# Patient Record
Sex: FEMALE | Race: WHITE | NOT HISPANIC OR LATINO
[De-identification: names, ages, dates, MRNs, and addresses within clinical notes are randomized per-mention and may not be internally consistent; named-entity substitution may affect disease eponyms.]

---

## 2019-01-25 PROBLEM — Z00.00 ENCOUNTER FOR PREVENTIVE HEALTH EXAMINATION: Status: ACTIVE | Noted: 2019-01-25

## 2020-11-05 ENCOUNTER — APPOINTMENT (OUTPATIENT)
Dept: SURGERY | Facility: CLINIC | Age: 55
End: 2020-11-05
Payer: COMMERCIAL

## 2020-11-05 PROCEDURE — 99213 OFFICE O/P EST LOW 20 MIN: CPT

## 2020-11-05 PROCEDURE — 99072 ADDL SUPL MATRL&STAF TM PHE: CPT

## 2021-05-13 ENCOUNTER — APPOINTMENT (OUTPATIENT)
Dept: SURGERY | Facility: CLINIC | Age: 56
End: 2021-05-13
Payer: COMMERCIAL

## 2021-05-13 PROCEDURE — 99072 ADDL SUPL MATRL&STAF TM PHE: CPT

## 2021-05-13 PROCEDURE — 99213 OFFICE O/P EST LOW 20 MIN: CPT

## 2021-08-04 DIAGNOSIS — Z82.49 FAMILY HISTORY OF ISCHEMIC HEART DISEASE AND OTHER DISEASES OF THE CIRCULATORY SYSTEM: ICD-10-CM

## 2021-08-04 DIAGNOSIS — Z86.59 PERSONAL HISTORY OF OTHER MENTAL AND BEHAVIORAL DISORDERS: ICD-10-CM

## 2021-08-04 DIAGNOSIS — Z83.79 FAMILY HISTORY OF OTHER DISEASES OF THE DIGESTIVE SYSTEM: ICD-10-CM

## 2021-08-04 DIAGNOSIS — Z87.891 PERSONAL HISTORY OF NICOTINE DEPENDENCE: ICD-10-CM

## 2021-08-04 DIAGNOSIS — Z81.8 FAMILY HISTORY OF OTHER MENTAL AND BEHAVIORAL DISORDERS: ICD-10-CM

## 2021-08-04 DIAGNOSIS — E03.9 HYPOTHYROIDISM, UNSPECIFIED: ICD-10-CM

## 2021-08-04 DIAGNOSIS — Z83.3 FAMILY HISTORY OF DIABETES MELLITUS: ICD-10-CM

## 2021-08-04 DIAGNOSIS — N60.19 DIFFUSE CYSTIC MASTOPATHY OF UNSPECIFIED BREAST: ICD-10-CM

## 2021-08-04 DIAGNOSIS — Z86.2 PERSONAL HISTORY OF DISEASES OF THE BLOOD AND BLOOD-FORMING ORGANS AND CERTAIN DISORDERS INVOLVING THE IMMUNE MECHANISM: ICD-10-CM

## 2021-08-04 DIAGNOSIS — Z80.0 FAMILY HISTORY OF MALIGNANT NEOPLASM OF DIGESTIVE ORGANS: ICD-10-CM

## 2021-08-04 DIAGNOSIS — Z80.3 FAMILY HISTORY OF MALIGNANT NEOPLASM OF BREAST: ICD-10-CM

## 2021-08-04 DIAGNOSIS — Z82.5 FAMILY HISTORY OF ASTHMA AND OTHER CHRONIC LOWER RESPIRATORY DISEASES: ICD-10-CM

## 2021-08-04 DIAGNOSIS — Z81.1 FAMILY HISTORY OF ALCOHOL ABUSE AND DEPENDENCE: ICD-10-CM

## 2021-08-04 DIAGNOSIS — Z83.42 FAMILY HISTORY OF FAMILIAL HYPERCHOLESTEROLEMIA: ICD-10-CM

## 2021-08-04 DIAGNOSIS — Z87.11 PERSONAL HISTORY OF PEPTIC ULCER DISEASE: ICD-10-CM

## 2021-08-04 RX ORDER — EZETIMIBE 10 MG/1
10 TABLET ORAL DAILY
Refills: 0 | Status: ACTIVE | COMMUNITY

## 2021-08-04 RX ORDER — DICLOFENAC SODIUM 20 MG/G
2 SOLUTION TOPICAL
Refills: 0 | Status: ACTIVE | COMMUNITY

## 2021-08-04 RX ORDER — QUETIAPINE FUMARATE 400 MG/1
400 TABLET ORAL
Refills: 0 | Status: ACTIVE | COMMUNITY

## 2021-08-04 RX ORDER — LEVOTHYROXINE SODIUM 100 MCG
100 TABLET ORAL DAILY
Refills: 0 | Status: ACTIVE | COMMUNITY

## 2021-08-04 RX ORDER — SERTRALINE HYDROCHLORIDE 50 MG/1
50 TABLET, FILM COATED ORAL DAILY
Refills: 0 | Status: ACTIVE | COMMUNITY

## 2021-08-04 RX ORDER — OMEPRAZOLE 20 MG/1
20 TABLET, DELAYED RELEASE ORAL
Refills: 0 | Status: ACTIVE | COMMUNITY

## 2021-08-04 RX ORDER — LIRAGLUTIDE 6 MG/ML
INJECTION, SOLUTION SUBCUTANEOUS
Refills: 0 | Status: ACTIVE | COMMUNITY

## 2021-08-04 RX ORDER — RAMIPRIL 10 MG/1
10 CAPSULE ORAL DAILY
Refills: 0 | Status: ACTIVE | COMMUNITY

## 2021-08-04 RX ORDER — DICLOFENAC SODIUM 75 MG/1
75 TABLET, DELAYED RELEASE ORAL
Refills: 0 | Status: ACTIVE | COMMUNITY

## 2021-08-04 RX ORDER — ERGOCALCIFEROL 1.25 MG/1
1.25 MG CAPSULE, LIQUID FILLED ORAL
Refills: 0 | Status: ACTIVE | COMMUNITY

## 2022-01-06 ENCOUNTER — APPOINTMENT (OUTPATIENT)
Dept: SURGERY | Facility: CLINIC | Age: 57
End: 2022-01-06

## 2022-09-26 ENCOUNTER — NON-APPOINTMENT (OUTPATIENT)
Age: 57
End: 2022-09-26

## 2022-10-15 ENCOUNTER — NON-APPOINTMENT (OUTPATIENT)
Age: 57
End: 2022-10-15

## 2022-10-15 ENCOUNTER — APPOINTMENT (OUTPATIENT)
Dept: ORTHOPEDIC SURGERY | Facility: CLINIC | Age: 57
End: 2022-10-15

## 2022-10-15 VITALS
WEIGHT: 198.9 LBS | SYSTOLIC BLOOD PRESSURE: 106 MMHG | HEART RATE: 98 BPM | DIASTOLIC BLOOD PRESSURE: 74 MMHG | HEIGHT: 67 IN | BODY MASS INDEX: 31.22 KG/M2

## 2022-10-15 DIAGNOSIS — M17.12 UNILATERAL PRIMARY OSTEOARTHRITIS, LEFT KNEE: ICD-10-CM

## 2022-10-15 PROCEDURE — 73564 X-RAY EXAM KNEE 4 OR MORE: CPT | Mod: 50

## 2022-10-15 PROCEDURE — 99204 OFFICE O/P NEW MOD 45 MIN: CPT

## 2022-10-15 PROCEDURE — 72170 X-RAY EXAM OF PELVIS: CPT | Mod: 59

## 2022-10-26 ENCOUNTER — TRANSCRIPTION ENCOUNTER (OUTPATIENT)
Age: 57
End: 2022-10-26

## 2022-12-20 ENCOUNTER — OUTPATIENT (OUTPATIENT)
Dept: OUTPATIENT SERVICES | Facility: HOSPITAL | Age: 57
LOS: 1 days | End: 2022-12-20
Payer: COMMERCIAL

## 2022-12-20 VITALS
DIASTOLIC BLOOD PRESSURE: 70 MMHG | HEIGHT: 66.5 IN | OXYGEN SATURATION: 99 % | SYSTOLIC BLOOD PRESSURE: 109 MMHG | WEIGHT: 201.94 LBS | RESPIRATION RATE: 14 BRPM | HEART RATE: 80 BPM | TEMPERATURE: 98 F

## 2022-12-20 DIAGNOSIS — Z90.49 ACQUIRED ABSENCE OF OTHER SPECIFIED PARTS OF DIGESTIVE TRACT: Chronic | ICD-10-CM

## 2022-12-20 DIAGNOSIS — M17.12 UNILATERAL PRIMARY OSTEOARTHRITIS, LEFT KNEE: ICD-10-CM

## 2022-12-20 DIAGNOSIS — Z98.890 OTHER SPECIFIED POSTPROCEDURAL STATES: Chronic | ICD-10-CM

## 2022-12-20 DIAGNOSIS — Z98.1 ARTHRODESIS STATUS: Chronic | ICD-10-CM

## 2022-12-20 DIAGNOSIS — Z01.818 ENCOUNTER FOR OTHER PREPROCEDURAL EXAMINATION: ICD-10-CM

## 2022-12-20 LAB
A1C WITH ESTIMATED AVERAGE GLUCOSE RESULT: 5 % — SIGNIFICANT CHANGE UP (ref 4–5.6)
ALBUMIN SERPL ELPH-MCNC: 3.9 G/DL — SIGNIFICANT CHANGE UP (ref 3.3–5)
ALP SERPL-CCNC: 83 U/L — SIGNIFICANT CHANGE UP (ref 30–120)
ALT FLD-CCNC: 18 U/L DA — SIGNIFICANT CHANGE UP (ref 10–60)
ANION GAP SERPL CALC-SCNC: 7 MMOL/L — SIGNIFICANT CHANGE UP (ref 5–17)
APTT BLD: 31 SEC — SIGNIFICANT CHANGE UP (ref 27.5–35.5)
AST SERPL-CCNC: 21 U/L — SIGNIFICANT CHANGE UP (ref 10–40)
BILIRUB SERPL-MCNC: 0.4 MG/DL — SIGNIFICANT CHANGE UP (ref 0.2–1.2)
BLD GP AB SCN SERPL QL: SIGNIFICANT CHANGE UP
BUN SERPL-MCNC: 18 MG/DL — SIGNIFICANT CHANGE UP (ref 7–23)
CALCIUM SERPL-MCNC: 8.9 MG/DL — SIGNIFICANT CHANGE UP (ref 8.4–10.5)
CHLORIDE SERPL-SCNC: 107 MMOL/L — SIGNIFICANT CHANGE UP (ref 96–108)
CO2 SERPL-SCNC: 28 MMOL/L — SIGNIFICANT CHANGE UP (ref 22–31)
CREAT SERPL-MCNC: 0.75 MG/DL — SIGNIFICANT CHANGE UP (ref 0.5–1.3)
EGFR: 93 ML/MIN/1.73M2 — SIGNIFICANT CHANGE UP
ESTIMATED AVERAGE GLUCOSE: 97 MG/DL — SIGNIFICANT CHANGE UP (ref 68–114)
GLUCOSE SERPL-MCNC: 84 MG/DL — SIGNIFICANT CHANGE UP (ref 70–99)
HCT VFR BLD CALC: 39.4 % — SIGNIFICANT CHANGE UP (ref 34.5–45)
HGB BLD-MCNC: 13 G/DL — SIGNIFICANT CHANGE UP (ref 11.5–15.5)
INR BLD: 1.07 RATIO — SIGNIFICANT CHANGE UP (ref 0.88–1.16)
MCHC RBC-ENTMCNC: 30 PG — SIGNIFICANT CHANGE UP (ref 27–34)
MCHC RBC-ENTMCNC: 33 GM/DL — SIGNIFICANT CHANGE UP (ref 32–36)
MCV RBC AUTO: 90.8 FL — SIGNIFICANT CHANGE UP (ref 80–100)
NRBC # BLD: 0 /100 WBCS — SIGNIFICANT CHANGE UP (ref 0–0)
PLATELET # BLD AUTO: 230 K/UL — SIGNIFICANT CHANGE UP (ref 150–400)
POTASSIUM SERPL-MCNC: 4.3 MMOL/L — SIGNIFICANT CHANGE UP (ref 3.5–5.3)
POTASSIUM SERPL-SCNC: 4.3 MMOL/L — SIGNIFICANT CHANGE UP (ref 3.5–5.3)
PROT SERPL-MCNC: 7.3 G/DL — SIGNIFICANT CHANGE UP (ref 6–8.3)
PROTHROM AB SERPL-ACNC: 12.6 SEC — SIGNIFICANT CHANGE UP (ref 10.5–13.4)
RBC # BLD: 4.34 M/UL — SIGNIFICANT CHANGE UP (ref 3.8–5.2)
RBC # FLD: 12.2 % — SIGNIFICANT CHANGE UP (ref 10.3–14.5)
SODIUM SERPL-SCNC: 142 MMOL/L — SIGNIFICANT CHANGE UP (ref 135–145)
WBC # BLD: 4.81 K/UL — SIGNIFICANT CHANGE UP (ref 3.8–10.5)
WBC # FLD AUTO: 4.81 K/UL — SIGNIFICANT CHANGE UP (ref 3.8–10.5)

## 2022-12-20 PROCEDURE — G0463: CPT

## 2022-12-20 PROCEDURE — 93005 ELECTROCARDIOGRAM TRACING: CPT

## 2022-12-20 PROCEDURE — 93010 ELECTROCARDIOGRAM REPORT: CPT

## 2022-12-20 NOTE — H&P PST ADULT - NSICDXPASTMEDICALHX_GEN_ALL_CORE_FT
PAST MEDICAL HISTORY:  2019 novel coronavirus disease (COVID-19)     Anxiety and depression     Bipolar depression     HLD (hyperlipidemia)     Hypothyroid     Osteoarthritis of left knee

## 2022-12-20 NOTE — H&P PST ADULT - NSANTHOSAYNRD_GEN_A_CORE
No. SABINE screening performed.  STOP BANG Legend: 0-2 = LOW Risk; 3-4 = INTERMEDIATE Risk; 5-8 = HIGH Risk

## 2022-12-20 NOTE — H&P PST ADULT - NSICDXPASTSURGICALHX_GEN_ALL_CORE_FT
PAST SURGICAL HISTORY:  S/P appendectomy     S/P bilateral breast reduction     S/P breast biopsy     S/P cervical spinal fusion     S/P cholecystectomy     S/P lumpectomy, left breast

## 2022-12-20 NOTE — H&P PST ADULT - ATTENDING COMMENTS
The discussion regarding options for nonoperative and operative management was introduced through a patient shared decision making protocol. The benefits of surgery versus the risks were discussed with the patient  and family.  Risks of surgery include medical complications of anesthesia, DVT, pulmonary embolism, hardware complications, need for revision surgery, infection, neurovascular injury, as well as dislocation, fracture, and limb length discrepancy were addressed with the patient. The patient appeared to understand the ramifications of surgery and had ample time for questions, then consenting for a left total knee replacement.

## 2022-12-20 NOTE — H&P PST ADULT - PROBLEM SELECTOR PLAN 1
scheduled for left knee replacement on 1/9/22  will obtain medical clearance   Pre op instructions   COVID test on 1/6/22 !2N scheduled for left knee replacement on 1/9/22  will obtain medical clearance   Pre op instructions   COVID test on 1/6/23  1 pm

## 2022-12-20 NOTE — H&P PST ADULT - NSICDXFAMILYHX_GEN_ALL_CORE_FT
FAMILY HISTORY:  FHx: heart disease    Mother  Still living? No  Family history of breast cancer in mother, Age at diagnosis: Age Unknown  FH: pancreatic cancer, Age at diagnosis: Age Unknown  FH: type 2 diabetes, Age at diagnosis: Age Unknown

## 2022-12-20 NOTE — H&P PST ADULT - MUSCULOSKELETAL
left knee/no calf tenderness/decreased ROM/decreased ROM due to pain/no chest wall tenderness details… left knee/no calf tenderness/decreased ROM/decreased ROM due to pain/back exam

## 2022-12-20 NOTE — H&P PST ADULT - HISTORY OF PRESENT ILLNESS
This is a 58 y/o female who presents with 10 year history og left knee pain and swelling . Reports constant achy pain and pain excaerbates with when walking , sitting ,climbing stiars 10/10 . Diclofenac PRN for pain with mild relief . Received cortisone injections in the past 2 yrs ago with mild relief . scheduled for left kne replacement on 1/9/22 This is a 58 y/o female who presents with 10 year history of left knee pain and swelling . Reports constant achy pain and pain exacerbates with walking, sitting ,climbing stairs  10/10 . Diclofenac PRN for pain with mild relief . Received cortisone injections in the past 2 yrs ago with mild relief . scheduled for left knee replacement on 1/9/23

## 2022-12-21 LAB
MRSA PCR RESULT.: SIGNIFICANT CHANGE UP
S AUREUS DNA NOSE QL NAA+PROBE: SIGNIFICANT CHANGE UP

## 2023-01-05 PROBLEM — E78.5 HYPERLIPIDEMIA, UNSPECIFIED: Chronic | Status: ACTIVE | Noted: 2022-12-20

## 2023-01-05 PROBLEM — U07.1 COVID-19: Chronic | Status: ACTIVE | Noted: 2022-12-20

## 2023-01-05 PROBLEM — F31.9 BIPOLAR DISORDER, UNSPECIFIED: Chronic | Status: ACTIVE | Noted: 2022-12-20

## 2023-01-05 PROBLEM — F41.9 ANXIETY DISORDER, UNSPECIFIED: Chronic | Status: ACTIVE | Noted: 2022-12-20

## 2023-01-06 ENCOUNTER — OUTPATIENT (OUTPATIENT)
Dept: OUTPATIENT SERVICES | Facility: HOSPITAL | Age: 58
LOS: 1 days | End: 2023-01-06
Payer: COMMERCIAL

## 2023-01-06 DIAGNOSIS — Z90.49 ACQUIRED ABSENCE OF OTHER SPECIFIED PARTS OF DIGESTIVE TRACT: Chronic | ICD-10-CM

## 2023-01-06 DIAGNOSIS — Z98.890 OTHER SPECIFIED POSTPROCEDURAL STATES: Chronic | ICD-10-CM

## 2023-01-06 DIAGNOSIS — Z20.828 CONTACT WITH AND (SUSPECTED) EXPOSURE TO OTHER VIRAL COMMUNICABLE DISEASES: ICD-10-CM

## 2023-01-06 DIAGNOSIS — Z98.1 ARTHRODESIS STATUS: Chronic | ICD-10-CM

## 2023-01-06 PROCEDURE — U0003: CPT

## 2023-01-06 PROCEDURE — U0005: CPT

## 2023-01-06 RX ORDER — CEFAZOLIN SODIUM 1 G
2000 VIAL (EA) INJECTION ONCE
Refills: 0 | Status: DISCONTINUED | OUTPATIENT
Start: 2023-01-09 | End: 2023-01-09

## 2023-01-06 RX ORDER — ACETAMINOPHEN 500 MG
1000 TABLET ORAL ONCE
Refills: 0 | Status: DISCONTINUED | OUTPATIENT
Start: 2023-01-09 | End: 2023-01-09

## 2023-01-06 RX ORDER — TRANEXAMIC ACID 100 MG/ML
1000 INJECTION, SOLUTION INTRAVENOUS ONCE
Refills: 0 | Status: DISCONTINUED | OUTPATIENT
Start: 2023-01-09 | End: 2023-01-23

## 2023-01-06 RX ORDER — TRANEXAMIC ACID 100 MG/ML
1 INJECTION, SOLUTION INTRAVENOUS ONCE
Refills: 0 | Status: DISCONTINUED | OUTPATIENT
Start: 2023-01-09 | End: 2023-01-23

## 2023-01-07 LAB — SARS-COV-2 RNA SPEC QL NAA+PROBE: SIGNIFICANT CHANGE UP

## 2023-01-08 ENCOUNTER — TRANSCRIPTION ENCOUNTER (OUTPATIENT)
Age: 58
End: 2023-01-08

## 2023-01-09 ENCOUNTER — OUTPATIENT (OUTPATIENT)
Dept: OUTPATIENT SERVICES | Facility: HOSPITAL | Age: 58
LOS: 1 days | End: 2023-01-09
Payer: COMMERCIAL

## 2023-01-09 ENCOUNTER — TRANSCRIPTION ENCOUNTER (OUTPATIENT)
Age: 58
End: 2023-01-09

## 2023-01-09 ENCOUNTER — APPOINTMENT (OUTPATIENT)
Dept: ORTHOPEDIC SURGERY | Facility: HOSPITAL | Age: 58
End: 2023-01-09

## 2023-01-09 VITALS
HEART RATE: 95 BPM | OXYGEN SATURATION: 97 % | SYSTOLIC BLOOD PRESSURE: 121 MMHG | RESPIRATION RATE: 20 BRPM | WEIGHT: 197.75 LBS | HEIGHT: 66 IN | DIASTOLIC BLOOD PRESSURE: 61 MMHG | TEMPERATURE: 98 F

## 2023-01-09 DIAGNOSIS — E03.9 HYPOTHYROIDISM, UNSPECIFIED: ICD-10-CM

## 2023-01-09 DIAGNOSIS — Z90.49 ACQUIRED ABSENCE OF OTHER SPECIFIED PARTS OF DIGESTIVE TRACT: Chronic | ICD-10-CM

## 2023-01-09 DIAGNOSIS — Z98.890 OTHER SPECIFIED POSTPROCEDURAL STATES: Chronic | ICD-10-CM

## 2023-01-09 DIAGNOSIS — E78.5 HYPERLIPIDEMIA, UNSPECIFIED: ICD-10-CM

## 2023-01-09 DIAGNOSIS — Z98.1 ARTHRODESIS STATUS: Chronic | ICD-10-CM

## 2023-01-09 DIAGNOSIS — F31.9 BIPOLAR DISORDER, UNSPECIFIED: ICD-10-CM

## 2023-01-09 DIAGNOSIS — M17.12 UNILATERAL PRIMARY OSTEOARTHRITIS, LEFT KNEE: ICD-10-CM

## 2023-01-09 LAB — ABO RH CONFIRMATION: SIGNIFICANT CHANGE UP

## 2023-01-09 PROCEDURE — 99203 OFFICE O/P NEW LOW 30 MIN: CPT | Mod: 25

## 2023-01-09 PROCEDURE — 73562 X-RAY EXAM OF KNEE 3: CPT | Mod: 26,LT

## 2023-01-09 PROCEDURE — 27447 TOTAL KNEE ARTHROPLASTY: CPT | Mod: LT

## 2023-01-09 PROCEDURE — 27447 TOTAL KNEE ARTHROPLASTY: CPT | Mod: AS,LT

## 2023-01-09 DEVICE — IMPLANTABLE DEVICE: Type: IMPLANTABLE DEVICE | Site: LEFT | Status: FUNCTIONAL

## 2023-01-09 DEVICE — COMP FEM NON PORUS SZ 5 LT: Type: IMPLANTABLE DEVICE | Site: LEFT | Status: FUNCTIONAL

## 2023-01-09 DEVICE — STEM MOD UNIV TIB 10X40MM: Type: IMPLANTABLE DEVICE | Site: LEFT | Status: FUNCTIONAL

## 2023-01-09 DEVICE — BASEPLATE TIB NONPOROUS UNIV SZ  5 LT: Type: IMPLANTABLE DEVICE | Site: LEFT | Status: FUNCTIONAL

## 2023-01-09 DEVICE — COMP PATELLA TRI-PEG E-PLUS POLY 8X32MM: Type: IMPLANTABLE DEVICE | Site: LEFT | Status: FUNCTIONAL

## 2023-01-09 RX ORDER — OXYCODONE HYDROCHLORIDE 5 MG/1
10 TABLET ORAL
Refills: 0 | Status: DISCONTINUED | OUTPATIENT
Start: 2023-01-09 | End: 2023-01-09

## 2023-01-09 RX ORDER — TOPIRAMATE 25 MG
50 TABLET ORAL DAILY
Refills: 0 | Status: DISCONTINUED | OUTPATIENT
Start: 2023-01-09 | End: 2023-01-23

## 2023-01-09 RX ORDER — ASPIRIN/CALCIUM CARB/MAGNESIUM 324 MG
1 TABLET ORAL
Qty: 56 | Refills: 0
Start: 2023-01-09 | End: 2023-02-05

## 2023-01-09 RX ORDER — PANTOPRAZOLE SODIUM 20 MG/1
40 TABLET, DELAYED RELEASE ORAL
Refills: 0 | Status: DISCONTINUED | OUTPATIENT
Start: 2023-01-09 | End: 2023-01-23

## 2023-01-09 RX ORDER — MAGNESIUM HYDROXIDE 400 MG/1
30 TABLET, CHEWABLE ORAL DAILY
Refills: 0 | Status: DISCONTINUED | OUTPATIENT
Start: 2023-01-09 | End: 2023-01-23

## 2023-01-09 RX ORDER — SERTRALINE 25 MG/1
100 TABLET, FILM COATED ORAL DAILY
Refills: 0 | Status: DISCONTINUED | OUTPATIENT
Start: 2023-01-09 | End: 2023-01-23

## 2023-01-09 RX ORDER — QUETIAPINE FUMARATE 200 MG/1
400 TABLET, FILM COATED ORAL AT BEDTIME
Refills: 0 | Status: DISCONTINUED | OUTPATIENT
Start: 2023-01-09 | End: 2023-01-23

## 2023-01-09 RX ORDER — HYDROMORPHONE HYDROCHLORIDE 2 MG/ML
0.5 INJECTION INTRAMUSCULAR; INTRAVENOUS; SUBCUTANEOUS
Refills: 0 | Status: DISCONTINUED | OUTPATIENT
Start: 2023-01-09 | End: 2023-01-09

## 2023-01-09 RX ORDER — CHLORHEXIDINE GLUCONATE 213 G/1000ML
1 SOLUTION TOPICAL ONCE
Refills: 0 | Status: COMPLETED | OUTPATIENT
Start: 2023-01-09 | End: 2023-01-09

## 2023-01-09 RX ORDER — DICLOFENAC SODIUM 75 MG/1
0 TABLET, DELAYED RELEASE ORAL
Qty: 0 | Refills: 0 | DISCHARGE

## 2023-01-09 RX ORDER — SODIUM CHLORIDE 9 MG/ML
500 INJECTION INTRAMUSCULAR; INTRAVENOUS; SUBCUTANEOUS ONCE
Refills: 0 | Status: COMPLETED | OUTPATIENT
Start: 2023-01-09 | End: 2023-01-09

## 2023-01-09 RX ORDER — ACETAMINOPHEN 500 MG
1000 TABLET ORAL EVERY 6 HOURS
Refills: 0 | Status: COMPLETED | OUTPATIENT
Start: 2023-01-09 | End: 2023-01-09

## 2023-01-09 RX ORDER — ASPIRIN/CALCIUM CARB/MAGNESIUM 324 MG
81 TABLET ORAL EVERY 12 HOURS
Refills: 0 | Status: DISCONTINUED | OUTPATIENT
Start: 2023-01-10 | End: 2023-01-23

## 2023-01-09 RX ORDER — OMEPRAZOLE 10 MG/1
1 CAPSULE, DELAYED RELEASE ORAL
Qty: 28 | Refills: 0
Start: 2023-01-09 | End: 2023-02-05

## 2023-01-09 RX ORDER — ONDANSETRON 8 MG/1
4 TABLET, FILM COATED ORAL EVERY 6 HOURS
Refills: 0 | Status: DISCONTINUED | OUTPATIENT
Start: 2023-01-09 | End: 2023-01-23

## 2023-01-09 RX ORDER — POLYETHYLENE GLYCOL 3350 17 G/17G
17 POWDER, FOR SOLUTION ORAL AT BEDTIME
Refills: 0 | Status: DISCONTINUED | OUTPATIENT
Start: 2023-01-09 | End: 2023-01-23

## 2023-01-09 RX ORDER — CELECOXIB 200 MG/1
1 CAPSULE ORAL
Qty: 56 | Refills: 0
Start: 2023-01-09 | End: 2023-02-05

## 2023-01-09 RX ORDER — SODIUM CHLORIDE 9 MG/ML
1000 INJECTION, SOLUTION INTRAVENOUS
Refills: 0 | Status: DISCONTINUED | OUTPATIENT
Start: 2023-01-09 | End: 2023-01-09

## 2023-01-09 RX ORDER — HYDROMORPHONE HYDROCHLORIDE 2 MG/ML
1 INJECTION INTRAMUSCULAR; INTRAVENOUS; SUBCUTANEOUS
Refills: 0 | Status: DISCONTINUED | OUTPATIENT
Start: 2023-01-09 | End: 2023-01-09

## 2023-01-09 RX ORDER — CEFAZOLIN SODIUM 1 G
2000 VIAL (EA) INJECTION EVERY 8 HOURS
Refills: 0 | Status: COMPLETED | OUTPATIENT
Start: 2023-01-09 | End: 2023-01-09

## 2023-01-09 RX ORDER — ONDANSETRON 8 MG/1
4 TABLET, FILM COATED ORAL ONCE
Refills: 0 | Status: DISCONTINUED | OUTPATIENT
Start: 2023-01-09 | End: 2023-01-09

## 2023-01-09 RX ORDER — SENNA PLUS 8.6 MG/1
2 TABLET ORAL AT BEDTIME
Refills: 0 | Status: DISCONTINUED | OUTPATIENT
Start: 2023-01-09 | End: 2023-01-23

## 2023-01-09 RX ORDER — SODIUM CHLORIDE 9 MG/ML
1000 INJECTION, SOLUTION INTRAVENOUS
Refills: 0 | Status: DISCONTINUED | OUTPATIENT
Start: 2023-01-09 | End: 2023-01-23

## 2023-01-09 RX ORDER — QUETIAPINE FUMARATE 200 MG/1
200 TABLET, FILM COATED ORAL AT BEDTIME
Refills: 0 | Status: DISCONTINUED | OUTPATIENT
Start: 2023-01-09 | End: 2023-01-23

## 2023-01-09 RX ORDER — DEXAMETHASONE 0.5 MG/5ML
8 ELIXIR ORAL ONCE
Refills: 0 | Status: COMPLETED | OUTPATIENT
Start: 2023-01-10 | End: 2023-01-10

## 2023-01-09 RX ORDER — CELECOXIB 200 MG/1
200 CAPSULE ORAL EVERY 12 HOURS
Refills: 0 | Status: DISCONTINUED | OUTPATIENT
Start: 2023-01-09 | End: 2023-01-23

## 2023-01-09 RX ORDER — OXYCODONE HYDROCHLORIDE 5 MG/1
5 TABLET ORAL
Refills: 0 | Status: DISCONTINUED | OUTPATIENT
Start: 2023-01-09 | End: 2023-01-10

## 2023-01-09 RX ORDER — LEVOTHYROXINE SODIUM 125 MCG
112 TABLET ORAL DAILY
Refills: 0 | Status: DISCONTINUED | OUTPATIENT
Start: 2023-01-09 | End: 2023-01-23

## 2023-01-09 RX ORDER — APREPITANT 80 MG/1
40 CAPSULE ORAL ONCE
Refills: 0 | Status: COMPLETED | OUTPATIENT
Start: 2023-01-09 | End: 2023-01-09

## 2023-01-09 RX ADMIN — Medication 100 MILLIGRAM(S): at 16:42

## 2023-01-09 RX ADMIN — Medication 1000 MILLIGRAM(S): at 19:30

## 2023-01-09 RX ADMIN — POLYETHYLENE GLYCOL 3350 17 GRAM(S): 17 POWDER, FOR SOLUTION ORAL at 21:34

## 2023-01-09 RX ADMIN — SODIUM CHLORIDE 75 MILLILITER(S): 9 INJECTION, SOLUTION INTRAVENOUS at 13:56

## 2023-01-09 RX ADMIN — CELECOXIB 200 MILLIGRAM(S): 200 CAPSULE ORAL at 21:29

## 2023-01-09 RX ADMIN — SODIUM CHLORIDE 100 MILLILITER(S): 9 INJECTION, SOLUTION INTRAVENOUS at 23:52

## 2023-01-09 RX ADMIN — SODIUM CHLORIDE 1000 MILLILITER(S): 9 INJECTION INTRAMUSCULAR; INTRAVENOUS; SUBCUTANEOUS at 17:16

## 2023-01-09 RX ADMIN — APREPITANT 40 MILLIGRAM(S): 80 CAPSULE ORAL at 09:42

## 2023-01-09 RX ADMIN — OXYCODONE HYDROCHLORIDE 5 MILLIGRAM(S): 5 TABLET ORAL at 20:15

## 2023-01-09 RX ADMIN — SENNA PLUS 2 TABLET(S): 8.6 TABLET ORAL at 21:29

## 2023-01-09 RX ADMIN — SODIUM CHLORIDE 1000 MILLILITER(S): 9 INJECTION INTRAMUSCULAR; INTRAVENOUS; SUBCUTANEOUS at 14:11

## 2023-01-09 RX ADMIN — Medication 100 MILLIGRAM(S): at 23:51

## 2023-01-09 RX ADMIN — QUETIAPINE FUMARATE 400 MILLIGRAM(S): 200 TABLET, FILM COATED ORAL at 21:29

## 2023-01-09 RX ADMIN — Medication 400 MILLIGRAM(S): at 23:48

## 2023-01-09 RX ADMIN — SERTRALINE 100 MILLIGRAM(S): 25 TABLET, FILM COATED ORAL at 21:29

## 2023-01-09 RX ADMIN — OXYCODONE HYDROCHLORIDE 5 MILLIGRAM(S): 5 TABLET ORAL at 19:39

## 2023-01-09 RX ADMIN — QUETIAPINE FUMARATE 200 MILLIGRAM(S): 200 TABLET, FILM COATED ORAL at 21:28

## 2023-01-09 RX ADMIN — SODIUM CHLORIDE 100 MILLILITER(S): 9 INJECTION, SOLUTION INTRAVENOUS at 16:43

## 2023-01-09 RX ADMIN — CHLORHEXIDINE GLUCONATE 1 APPLICATION(S): 213 SOLUTION TOPICAL at 09:42

## 2023-01-09 RX ADMIN — CELECOXIB 200 MILLIGRAM(S): 200 CAPSULE ORAL at 21:34

## 2023-01-09 RX ADMIN — Medication 400 MILLIGRAM(S): at 19:10

## 2023-01-09 RX ADMIN — Medication 50 MILLIGRAM(S): at 21:29

## 2023-01-09 NOTE — PHYSICAL THERAPY INITIAL EVALUATION ADULT - ADDITIONAL COMMENTS
Pt lives with  in a private home, there are 3 stairs to enter and no stairs to navigate within the home. Pt has a tub shower. PTA pt was independent with ADLs and ambulation. Pt owns a RW and commode.

## 2023-01-09 NOTE — CONSULT NOTE ADULT - SUBJECTIVE AND OBJECTIVE BOX
Patient is a 57y old  Female who presents with a chief complaint of Left TKR for severe left knee OA.  (09 Jan 2023 13:30)      HPI: 57F presented with 10 year history of left knee pain and swelling.  She reported constant achy pain and pain exacerbated by walking, sitting, climbing stairs up to 10/10. Has been using Diclofenac PRN for pain with mild relief. Received cortisone injections in the past 2 years ago with mild relief.  Now s/p left knee replacement.    REVIEW OF SYSTEMS:  CONSTITUTIONAL: No fever, weight loss, or fatigue  EYES: No eye pain, visual disturbances, or discharge  ENMT:  No difficulty hearing, tinnitus, vertigo; No sinus or throat pain  NECK: No pain or stiffness  BREASTS: No pain, masses, or nipple discharge  RESPIRATORY: No cough, wheezing, chills or hemoptysis; No shortness of breath  CARDIOVASCULAR: No chest pain, palpitations, dizziness, or leg swelling  GASTROINTESTINAL: No abdominal or epigastric pain. No nausea, vomiting, or hematemesis; No diarrhea or constipation. No melena or hematochezia.  GENITOURINARY: No dysuria, frequency, hematuria, or incontinence  NEUROLOGICAL: No headaches, memory loss, loss of strength, numbness, or tremors  SKIN: No itching, burning, rashes, or lesions   LYMPH NODES: No enlarged glands  ENDOCRINE: No heat or cold intolerance; No hair loss  MUSCULOSKELETAL: No muscle or back pain  PSYCHIATRIC: No depression, anxiety, mood swings, or difficulty sleeping  HEME/LYMPH: No easy bruising, or bleeding gums  ALLERGY AND IMMUNOLOGIC: No hives or eczema    PAST MEDICAL & SURGICAL HISTORY:  2019 novel coronavirus disease (COVID-19)    Osteoarthritis of left knee    Hypothyroid    Bipolar depression    Anxiety and depression    HLD (hyperlipidemia)    S/P bilateral breast reduction    S/P appendectomy    S/P cholecystectomy    S/P lumpectomy, left breast    S/P cervical spinal fusion    S/P breast biopsy          SOCIAL HISTORY:  Residence: [ ] [facility]  [ ] SNF  [ ] Community  [ ] Substance abuse: denies  [ ] Tobacco: vapes  [ ] Alcohol use: denies    Allergies  codeine (Vomiting)  statins (Other)      MEDICATIONS  (STANDING):  acetaminophen   IVPB .. 1000 milliGRAM(s) IV Intermittent once  acetaminophen   IVPB .. 1000 milliGRAM(s) IV Intermittent every 6 hours  ceFAZolin   IVPB 2000 milliGRAM(s) IV Intermittent once  ceFAZolin   IVPB 2000 milliGRAM(s) IV Intermittent every 8 hours  celecoxib 200 milliGRAM(s) Oral every 12 hours  lactated ringers. 1000 milliLiter(s) (100 mL/Hr) IV Continuous <Continuous>  levothyroxine 112 MICROGram(s) Oral daily  pantoprazole    Tablet 40 milliGRAM(s) Oral before breakfast  polyethylene glycol 3350 17 Gram(s) Oral at bedtime  QUEtiapine 200 milliGRAM(s) Oral at bedtime  QUEtiapine 400 milliGRAM(s) Oral at bedtime  senna 2 Tablet(s) Oral at bedtime  sertraline 100 milliGRAM(s) Oral daily  sodium chloride 0.9% Bolus 500 milliLiter(s) IV Bolus once  topiramate 50 milliGRAM(s) Oral daily  tranexamic acid 4% Topical Irrigation 1 Application(s) IntraArticular once  tranexamic acid IVPB 1000 milliGRAM(s) IV Intermittent once    MEDICATIONS  (PRN):  HYDROmorphone  Injectable 0.5 milliGRAM(s) IV Push every 3 hours PRN breakthrough pain  magnesium hydroxide Suspension 30 milliLiter(s) Oral daily PRN Constipation  ondansetron Injectable 4 milliGRAM(s) IV Push every 6 hours PRN Nausea and/or Vomiting  oxyCODONE    IR 5 milliGRAM(s) Oral every 3 hours PRN Moderate Pain (4 - 6)  oxyCODONE    IR 10 milliGRAM(s) Oral every 3 hours PRN Severe Pain (7 - 10)      FAMILY HISTORY:  Family history of breast cancer in mother (Mother)    FHx: heart disease    FH: pancreatic cancer (Mother)    FH: type 2 diabetes (Mother)        Vital Signs Last 24 Hrs  T(C): 36.7 (09 Jan 2023 16:16), Max: 36.7 (09 Jan 2023 09:07)  T(F): 98.1 (09 Jan 2023 16:16), Max: 98.1 (09 Jan 2023 16:16)  HR: 79 (09 Jan 2023 16:16) (75 - 95)  BP: 100/55 (09 Jan 2023 16:16) (90/45 - 121/61)  BP(mean): --  RR: 18 (09 Jan 2023 16:16) (10 - 20)  SpO2: 97% (09 Jan 2023 16:16) (97% - 100%)    Parameters below as of 09 Jan 2023 16:16  Patient On (Oxygen Delivery Method): room air        PHYSICAL EXAM:    GENERAL: NAD, well-groomed, well-developed  HEAD:  Atraumatic, Normocephalic  EYES: cnjunctiva and sclera clear  ENMT: Moist mucous membranes  NECK: Supple, No JVD  NERVOUS SYSTEM:  Alert & Oriented X3, Good concentration; Moving all 4 extremities; No gross sensory deficits  CHEST/LUNG: Clear to auscultation bilaterally;   HEART: Regular rate and rhythm;   ABDOMEN: Soft, Nontender, Nondistended; Bowel sounds present  EXTREMITIES:  2+ Peripheral Pulses, No clubbing, cyanosis, or edema  LYMPH: No lymphadenopathy noted  /RECTAL: Not examined  BREAST: Not examined  SKIN: No rashes or lesions  INCISION:  ACE wrap dry and intact.     LABS:        CAPILLARY BLOOD GLUCOSE    RADIOLOGY & ADDITIONAL STUDIES:    EKG:  NSR  Personally Reviewed:  [x YES     Imaging:  TKR  Personally Reviewed:  [x] YES     Consultant(s) Notes Reviewed:   Pre-op med clearance reviewed.    Care Discussed with Consultants/Other Providers:

## 2023-01-09 NOTE — OCCUPATIONAL THERAPY INITIAL EVALUATION ADULT - LIVES WITH, PROFILE
Pt lives with her  in a private home, 3 steps to enter, 0 steps inside with a tub. Pt was independent with ADLs, IADLs, functional mobility/transfers prior to admission without AD./spouse

## 2023-01-09 NOTE — DISCHARGE NOTE PROVIDER - NSDCMRMEDTOKEN_GEN_ALL_CORE_FT
Aspirin Enteric Coated 81 mg oral delayed release tablet: 1 tab orally every 12 hours. Take aspirin 2 hours before Celebrex/Meloxicam.  CeleBREX 200 mg oral capsule: 1 cap orally every 12 hours. Take 2 hours after aspirin.  ezetimibe 10 mg oral tablet: tab(s) orally once a day  omeprazole 20 mg oral delayed release capsule: 1 cap(s) orally once a day  omeprazole 20 mg oral delayed release capsule: 1 cap(s) orally once a day   phentermine 15 mg oral capsule: 1 cap(s) orally once a day (in the morning)  QUEtiapine 200 mg oral tablet: orally once a day (at bedtime)  QUEtiapine 400 mg oral tablet, extended release: 1 tab(s) orally once a day (in the evening)  sertraline 100 mg oral tablet: 1 tab(s) orally once a day  Synthroid 112 mcg (0.112 mg) oral tablet: 1 tab(s) orally once a day  topiramate 50 mg oral tablet: orally once a day  Vitamin D2 50,000 intl units (1.25 mg) oral capsule (obsolete): orally 2 times a month  Wegovy (2.4 mg dose) subcutaneous solution: subcutaneous once a week   Aspirin Enteric Coated 81 mg oral delayed release tablet: 1 tab orally every 12 hours. Take aspirin 2 hours before Celebrex/Meloxicam.  CeleBREX 200 mg oral capsule: 1 cap orally every 12 hours. Take 2 hours after aspirin.  ezetimibe 10 mg oral tablet: tab(s) orally once a day  omeprazole 20 mg oral delayed release capsule: 1 cap(s) orally once a day   oxyCODONE 5 mg oral tablet: 1-2 tab(s) orally every 4 hours, As Needed -for moderate to severe pain MDD:6  Reference #: [id number]  phentermine 15 mg oral capsule: 1 cap(s) orally once a day (in the morning)  QUEtiapine 200 mg oral tablet: orally once a day (at bedtime)  QUEtiapine 400 mg oral tablet, extended release: 1 tab(s) orally once a day (in the evening)  sertraline 100 mg oral tablet: 1 tab(s) orally once a day  Synthroid 112 mcg (0.112 mg) oral tablet: 1 tab(s) orally once a day  topiramate 50 mg oral tablet: orally once a day  Vitamin D2 50,000 intl units (1.25 mg) oral capsule (obsolete): orally 2 times a month  Wegovy (2.4 mg dose) subcutaneous solution: subcutaneous once a week

## 2023-01-09 NOTE — ASU PATIENT PROFILE, ADULT - FALL HARM RISK - UNIVERSAL INTERVENTIONS
Bed in lowest position, wheels locked, appropriate side rails in place/Call bell, personal items and telephone in reach/Instruct patient to call for assistance before getting out of bed or chair/Non-slip footwear when patient is out of bed/[place] to call system/Physically safe environment - no spills, clutter or unnecessary equipment/Purposeful Proactive Rounding/Room/bathroom lighting operational, light cord in reach

## 2023-01-09 NOTE — DISCHARGE NOTE PROVIDER - HOSPITAL COURSE
This patient was admitted to [facility] with a history of severe degenerative joint disease of the left knee.  Patient underwent Pre-Surgical Testing and was medically cleared to undergo elective procedure. Patient underwent left TKR by Dr. Michael Stoll 1/9/23. Procedure was well tolerated.  No operative or chidi-operative complications arose during patient's hospital course.  Patient received antibiotic according to SCIP guidelines for infection prevention.  Aspirin 81mg q 12 h was given for DVT prophylaxis, in addition to the use of SCDs.  Anesthesia, Medical Hospitalist, Physical Therapy and Occupational Therapy were consulted. Patient is stable for discharge with a good prognosis.  Appropriate discharge instructions and medications are provided in this document.

## 2023-01-09 NOTE — CONSULT NOTE ADULT - PROBLEM SELECTOR RECOMMENDATION 9
Pain Management: acceptable- continue current care Tylenol ATC/Celebrex ATC/ Oxycodone PRN  Continue PT/OT  DVT proph: [ x ] low risk - Aspirin   DC plan:  [x ] Home with HC

## 2023-01-09 NOTE — DISCHARGE NOTE PROVIDER - PROVIDER TOKENS
PROVIDER:[TOKEN:[[id number]],SCHEDULEDAPPT:[01/27/2023],SCHEDULEDAPPTTIME:[10:30 AM],ESTABLISHEDPATIENT:[T]]

## 2023-01-09 NOTE — DISCHARGE NOTE PROVIDER - NSDCCPCAREPLAN_GEN_ALL_CORE_FT
PRINCIPAL DISCHARGE DIAGNOSIS  Diagnosis: Primary osteoarthritis of left knee  Assessment and Plan of Treatment: For your total knee replacement:  Physical Therapy/Occupational Therapy for: ambulation, transfers, stairs, ADL's (activities of daily living), range of motion exercises, and isometrics  -Activity  • Weight Bearing as tolerated with rolling walker.  • Cryo/cuff 20 minutes several times daily with at least a 1 hour break in between icing sessions  • Take short, frequent walks increasing the distance that you walk each day as tolerated.  • Change your position every hour to decrease pain and stiffness.  • Continue the exercises taught to you by your physical therapist.  • No driving until cleared by the doctor.  • No tub baths, hot tubs, or swimming pools until instructed by your doctor.  • Do not squat down on the floor.  • Do not kneel or twist your knee.  • Range of Motion Goals: Flexion= 120 degrees, Extension = 0 degrees  You have a Mepilex dressing. You may shower. Mepilex dressing is water resistant, not waterproof. Do not aim shower stream at surgical site.  Pat dry after showering.  Remove Mepilex dressing in 1 week. You have steristrips over your incision which will fall off on their own.  Keep incision clean. DO NOT APPLY ANYTHING to incision site (salves/ointments/creams).  May shower.  Do not scrub incision site. Pat dry after shower.     Thigh high compression stockings. Keep on operative leg until postoperative appointment. Can remove compression stocking to shower/ bathe.

## 2023-01-09 NOTE — DISCHARGE NOTE PROVIDER - CARE PROVIDER_API CALL
Michael Stoll)  Orthopedics  [address], [address]  [place], [place] [id number]  Phone: [phone]  Fax: [phone]  Established Patient  Scheduled Appointment: 01/27/2023 10:30 AM

## 2023-01-10 ENCOUNTER — TRANSCRIPTION ENCOUNTER (OUTPATIENT)
Age: 58
End: 2023-01-10

## 2023-01-10 VITALS
TEMPERATURE: 97 F | HEART RATE: 77 BPM | OXYGEN SATURATION: 92 % | SYSTOLIC BLOOD PRESSURE: 105 MMHG | DIASTOLIC BLOOD PRESSURE: 65 MMHG | RESPIRATION RATE: 18 BRPM

## 2023-01-10 LAB
ANION GAP SERPL CALC-SCNC: 7 MMOL/L — SIGNIFICANT CHANGE UP (ref 5–17)
BUN SERPL-MCNC: 12 MG/DL — SIGNIFICANT CHANGE UP (ref 7–23)
CALCIUM SERPL-MCNC: 9.1 MG/DL — SIGNIFICANT CHANGE UP (ref 8.4–10.5)
CHLORIDE SERPL-SCNC: 106 MMOL/L — SIGNIFICANT CHANGE UP (ref 96–108)
CO2 SERPL-SCNC: 24 MMOL/L — SIGNIFICANT CHANGE UP (ref 22–31)
CREAT SERPL-MCNC: 0.78 MG/DL — SIGNIFICANT CHANGE UP (ref 0.5–1.3)
EGFR: 89 ML/MIN/1.73M2 — SIGNIFICANT CHANGE UP
GLUCOSE SERPL-MCNC: 123 MG/DL — HIGH (ref 70–99)
HCT VFR BLD CALC: 36.1 % — SIGNIFICANT CHANGE UP (ref 34.5–45)
HGB BLD-MCNC: 12.1 G/DL — SIGNIFICANT CHANGE UP (ref 11.5–15.5)
MCHC RBC-ENTMCNC: 30.5 PG — SIGNIFICANT CHANGE UP (ref 27–34)
MCHC RBC-ENTMCNC: 33.5 GM/DL — SIGNIFICANT CHANGE UP (ref 32–36)
MCV RBC AUTO: 90.9 FL — SIGNIFICANT CHANGE UP (ref 80–100)
NRBC # BLD: 0 /100 WBCS — SIGNIFICANT CHANGE UP (ref 0–0)
PLATELET # BLD AUTO: 238 K/UL — SIGNIFICANT CHANGE UP (ref 150–400)
POTASSIUM SERPL-MCNC: 4.2 MMOL/L — SIGNIFICANT CHANGE UP (ref 3.5–5.3)
POTASSIUM SERPL-SCNC: 4.2 MMOL/L — SIGNIFICANT CHANGE UP (ref 3.5–5.3)
RBC # BLD: 3.97 M/UL — SIGNIFICANT CHANGE UP (ref 3.8–5.2)
RBC # FLD: 12.2 % — SIGNIFICANT CHANGE UP (ref 10.3–14.5)
SODIUM SERPL-SCNC: 137 MMOL/L — SIGNIFICANT CHANGE UP (ref 135–145)
WBC # BLD: 14.12 K/UL — HIGH (ref 3.8–10.5)
WBC # FLD AUTO: 14.12 K/UL — HIGH (ref 3.8–10.5)

## 2023-01-10 PROCEDURE — 97161 PT EVAL LOW COMPLEX 20 MIN: CPT

## 2023-01-10 PROCEDURE — 27447 TOTAL KNEE ARTHROPLASTY: CPT | Mod: LT

## 2023-01-10 PROCEDURE — 99212 OFFICE O/P EST SF 10 MIN: CPT

## 2023-01-10 PROCEDURE — 73562 X-RAY EXAM OF KNEE 3: CPT

## 2023-01-10 PROCEDURE — 97535 SELF CARE MNGMENT TRAINING: CPT

## 2023-01-10 PROCEDURE — 85027 COMPLETE CBC AUTOMATED: CPT

## 2023-01-10 PROCEDURE — C1776: CPT

## 2023-01-10 PROCEDURE — 80048 BASIC METABOLIC PNL TOTAL CA: CPT

## 2023-01-10 PROCEDURE — 97165 OT EVAL LOW COMPLEX 30 MIN: CPT

## 2023-01-10 PROCEDURE — C1713: CPT

## 2023-01-10 PROCEDURE — 97116 GAIT TRAINING THERAPY: CPT

## 2023-01-10 PROCEDURE — 97110 THERAPEUTIC EXERCISES: CPT

## 2023-01-10 PROCEDURE — 97530 THERAPEUTIC ACTIVITIES: CPT

## 2023-01-10 PROCEDURE — 36415 COLL VENOUS BLD VENIPUNCTURE: CPT

## 2023-01-10 RX ORDER — OMEPRAZOLE 10 MG/1
1 CAPSULE, DELAYED RELEASE ORAL
Qty: 0 | Refills: 0 | DISCHARGE

## 2023-01-10 RX ORDER — OXYCODONE HYDROCHLORIDE 5 MG/1
1 TABLET ORAL
Qty: 42 | Refills: 0
Start: 2023-01-10 | End: 2023-01-16

## 2023-01-10 RX ADMIN — SERTRALINE 100 MILLIGRAM(S): 25 TABLET, FILM COATED ORAL at 14:07

## 2023-01-10 RX ADMIN — CELECOXIB 200 MILLIGRAM(S): 200 CAPSULE ORAL at 08:41

## 2023-01-10 RX ADMIN — Medication 81 MILLIGRAM(S): at 05:15

## 2023-01-10 RX ADMIN — SODIUM CHLORIDE 100 MILLILITER(S): 9 INJECTION, SOLUTION INTRAVENOUS at 07:00

## 2023-01-10 RX ADMIN — OXYCODONE HYDROCHLORIDE 5 MILLIGRAM(S): 5 TABLET ORAL at 09:42

## 2023-01-10 RX ADMIN — PANTOPRAZOLE SODIUM 40 MILLIGRAM(S): 20 TABLET, DELAYED RELEASE ORAL at 05:14

## 2023-01-10 RX ADMIN — OXYCODONE HYDROCHLORIDE 5 MILLIGRAM(S): 5 TABLET ORAL at 13:15

## 2023-01-10 RX ADMIN — OXYCODONE HYDROCHLORIDE 5 MILLIGRAM(S): 5 TABLET ORAL at 12:12

## 2023-01-10 RX ADMIN — Medication 101.6 MILLIGRAM(S): at 05:18

## 2023-01-10 RX ADMIN — Medication 1000 MILLIGRAM(S): at 00:01

## 2023-01-10 RX ADMIN — CELECOXIB 200 MILLIGRAM(S): 200 CAPSULE ORAL at 09:42

## 2023-01-10 RX ADMIN — Medication 112 MICROGRAM(S): at 05:18

## 2023-01-10 RX ADMIN — OXYCODONE HYDROCHLORIDE 5 MILLIGRAM(S): 5 TABLET ORAL at 08:42

## 2023-01-10 RX ADMIN — Medication 50 MILLIGRAM(S): at 14:07

## 2023-01-10 NOTE — DISCHARGE NOTE NURSING/CASE MANAGEMENT/SOCIAL WORK - NSDCPEFALRISK_GEN_ALL_CORE
For information on Fall & Injury Prevention, visit: [email or web address] OR  [email or web address] OR  https://www.cdc.gov/steadi/patient.html

## 2023-01-10 NOTE — PROGRESS NOTE ADULT - SUBJECTIVE AND OBJECTIVE BOX
Discharge medication calendar:  ASA EC 81mg q12h x 4 weeks  APAP 1000mg q8h x 2-3 weeks  Celecoxib 200mg q12h x 2-3 weeks  Omeprazole 20mg QAM x 4 weeks  Narcotic PRN  Docusate 100mg TID while taking narcotic  Miralax, Senna, or Bisacodyl PRN for treatment of constipation  
JIMMY MARTINEZ                                                                [id number]                                                     Allergies---codeine (Vomiting)  statins (Other)        Pt is a 57y year old Female s/p left TKR.   Pt is A&O x 3, resting comforably, with no complaints.   Pain is 2/10.   Denies chest pain / shortness of breath / dyspnea / nausea / vomiting / headaches or light headed ness.         T(C): 36.7 (01-09-23 @ 16:16), Max: 36.7 (01-09-23 @ 09:07)  HR: 79 (01-09-23 @ 16:16) (75 - 95)  BP: 100/55 (01-09-23 @ 16:16) (90/45 - 121/61)  RR: 18 (01-09-23 @ 16:16) (10 - 20)  SpO2: 97% (01-09-23 @ 16:16) (97% - 100%)  Wt(kg): --    I&O's Detail    09 Jan 2023 07:01  -  09 Jan 2023 16:55  --------------------------------------------------------  IN:    Lactated Ringers: 1750 mL    Sodium Chloride 0.9% Bolus: 500 mL  Total IN: 2250 mL    OUT:    Blood Loss (mL): 200 mL  Total OUT: 200 mL    Total NET: 2050 mL        PE:   Left Lower Extremity:   Dressing/Ace bandage is C/D/I.   Neurovascularly intact.   No gross evidence of motor or sensory deficits.   +2 DP/PT pulses.   EHL/FHL/TA intact.   Toes are pink and mobile.   Capillary refill < 2 seconds.   PAS in place.          A:   Pt is a 57y year old Female S/P left total knee replacement, Post Op Day #0        Plan:    - Follow up with Medicine    - OOB with PT/OT   - Pain control    - Continue antibiotics as per SCIP protocol   - Incentive spirometry   - PAS stockings   - Following Lab results including Labs in A.M.   - Close monitoring   - DVT ppx = tranexamic acid 4% Topical Irrigation 1 Application(s) IntraArticular once  tranexamic acid IVPB 1000 milliGRAM(s) IV Intermittent once                                                                                                                                                                               Michael SOTO        
Orthopedic Progress Note     Interval History:  No acute events overnight, pain is well controlled.  Patient denies any chest pain, SOB, N/V, fevers/chills.    Exam:  T(C): 36.3 (01-10-23 @ 07:56), Max: 36.9 (01-10-23 @ 03:30)  HR: 77 (01-10-23 @ 07:56) (75 - 91)  BP: 105/65 (01-10-23 @ 07:56) (90/45 - 117/68)  RR: 18 (01-10-23 @ 07:56) (10 - 18)  SpO2: 92% (01-10-23 @ 07:56) (92% - 100%)  Wt(kg): --I&O's Summary    09 Jan 2023 07:01  -  10 Luke 2023 07:00  --------------------------------------------------------  IN: 2250 mL / OUT: 450 mL / NET: 1800 mL        Lying in bed in no apparent distress  Unlabored respirations    EXT:   Dressing clean, dry and intact.   Compression stocking in place.   Sensation is intact in the superficial peroneal, deep peroneal, tibial, sural and saphenous nerve distributions  Able to dorsiflex and plantarflex ankle. Wiggles toes  Foot warm and well perfused  Sequential compression device on            Labs:                        12.1   14.12 )-----------( 238      ( 10 Luke 2023 08:05 )             36.1    01-10    137  |  106  |  12  ----------------------------<  123<H>  4.2   |  24  |  0.78    Ca    9.1      10 Luke 2023 08:05    Assessment/ Plan: JIMMY MARTINEZ is postoperative day #1 s/p left total knee replacement. Overall doing well  - please ensure ice over operative site while in bed or chair.   - PT/ OT for rehabilitation. WBAT with ROM activities as tolerated  - multimodal pain regimen.   - surgical prophylactic antibiotics x 24 hours  - chemical DVT prophylaxis per protocol  - SCDs while in bed. Out of bed to chair   - thigh high compression stockings. Keep on operative leg until postoperative appointment. Can remove compression stocking to shower/ bathe.  - incentive spirometry  - disposition planning. Call [phone] to confirm or change postoperative appointment.  - page orthopedics team with questions or concerns.
Patient is a 57y old  Female who presents with a chief complaint of Left Knee Pain (10 Luke 2023 10:21)      INTERVAL HPI/OVERNIGHT EVENTS:  feeling well, no complaints.   pain controlled.     MEDICATIONS  (STANDING):  aspirin enteric coated 81 milliGRAM(s) Oral every 12 hours  celecoxib 200 milliGRAM(s) Oral every 12 hours  lactated ringers. 1000 milliLiter(s) (100 mL/Hr) IV Continuous <Continuous>  levothyroxine 112 MICROGram(s) Oral daily  pantoprazole    Tablet 40 milliGRAM(s) Oral before breakfast  polyethylene glycol 3350 17 Gram(s) Oral at bedtime  QUEtiapine 200 milliGRAM(s) Oral at bedtime  QUEtiapine 400 milliGRAM(s) Oral at bedtime  senna 2 Tablet(s) Oral at bedtime  sertraline 100 milliGRAM(s) Oral daily  topiramate 50 milliGRAM(s) Oral daily  tranexamic acid 4% Topical Irrigation 1 Application(s) IntraArticular once  tranexamic acid IVPB 1000 milliGRAM(s) IV Intermittent once    MEDICATIONS  (PRN):  HYDROmorphone  Injectable 0.5 milliGRAM(s) IV Push every 3 hours PRN breakthrough pain  magnesium hydroxide Suspension 30 milliLiter(s) Oral daily PRN Constipation  ondansetron Injectable 4 milliGRAM(s) IV Push every 6 hours PRN Nausea and/or Vomiting  oxyCODONE    IR 5 milliGRAM(s) Oral every 3 hours PRN Moderate Pain (4 - 6)  oxyCODONE    IR 10 milliGRAM(s) Oral every 3 hours PRN Severe Pain (7 - 10)      Allergies  No Known Allergies    Intolerances  codeine (Vomiting)  statins (Muscle Pain)      REVIEW OF SYSTEMS:  CONSTITUTIONAL: No fever, weight loss, or fatigue  EYES: No eye pain, visual disturbances, or discharge  ENMT:  No difficulty hearing, tinnitus, vertigo; No sinus or throat pain  NECK: No pain or stiffness  BREASTS: No pain, masses, or nipple discharge  RESPIRATORY: No cough, wheezing, chills or hemoptysis; No shortness of breath  CARDIOVASCULAR: No chest pain, palpitations, or lightheadedness  GASTROINTESTINAL: No abdominal or epigastric pain. No nausea, vomiting, or hematemesis; No diarrhea or constipation. No melena or hematochezia.  GENITOURINARY: No dysuria, frequency, hematuria, or incontinence  NEUROLOGICAL: No headaches, vertigo, memory loss, loss of strength, numbness, or tremors  SKIN: No itching, burning, rashes, or lesions   LYMPH NODES: No enlarged glands  ENDOCRINE: No heat or cold intolerance; No hair loss; No polydipsia or polyuria  MUSCULOSKELETAL: No back pain  PSYCHIATRIC: No depression, anxiety, or mood swings  HEME/LYMPH: No easy bruising, or bleeding gums  ALLERGY AND IMMUNOLOGIC: No hives or eczema    Vital Signs Last 24 Hrs  T(C): 36.3 (10 Luke 2023 07:56), Max: 36.9 (10 Luke 2023 03:30)  T(F): 97.4 (10 Luke 2023 07:56), Max: 98.4 (10 Luke 2023 03:30)  HR: 77 (10 Luke 2023 07:56) (75 - 91)  BP: 105/65 (10 Luke 2023 07:56) (90/45 - 117/68)  BP(mean): --  RR: 18 (10 Luke 2023 07:56) (10 - 18)  SpO2: 92% (10 Luke 2023 07:56) (92% - 100%)    Parameters below as of 09 Jan 2023 16:16  Patient On (Oxygen Delivery Method): room air        PHYSICAL EXAM:  GENERAL: NAD, well-groomed, well-developed  HEAD:  Atraumatic, Normocephalic  EYES: conjunctiva and sclera clear  ENMT: Moist mucous membranes  NECK: Supple, No JVD  NERVOUS SYSTEM:  Alert & Oriented X3, Good concentration; Bilateral LE mobile, sensation to light touch intact  CHEST/LUNG: Clear to auscultation bilaterally;   HEART: Regular rate and rhythm;   ABDOMEN: Soft, Nontender, Nondistended; Bowel sounds present  EXTREMITIES:  2+ Peripheral Pulses, No clubbing or cyanosis  LYMPH: No lymphadenopathy noted  SKIN: No rashes or lesions  INCISION:  Dressing dry and intact    LABS:                        12.1   14.12 )-----------( 238      ( 10 Luke 2023 08:05 )             36.1     10 Luke 2023 08:05    137    |  106    |  12     ----------------------------<  123    4.2     |  24     |  0.78     Ca    9.1        10 Luke 2023 08:05          CAPILLARY BLOOD GLUCOSE          RADIOLOGY & ADDITIONAL TESTS:    Imaging Personally Reviewed:      [ ] Consultant(s) Notes Reviewed  [x] Care Discussed with Consultants/Other Providers:  Ortho PA- plan of care

## 2023-01-10 NOTE — DISCHARGE NOTE NURSING/CASE MANAGEMENT/SOCIAL WORK - PATIENT PORTAL LINK FT
You can access the FollowMyHealth Patient Portal offered by [facility] by registering at the following website: [email or web address]. By joining [facility]’s FollowMyHealth portal, you will also be able to view your health information using other applications (apps) compatible with our system.

## 2023-01-10 NOTE — PROGRESS NOTE ADULT - PROBLEM SELECTOR PLAN 1
Pain Management: acceptable- continue current care Tylenol ATC/Celebrex ATC/ Oxycodone PRN  Continue PT/OT  DVT proph: [ x ] low risk - Aspirin   DC plan:  [x ] Home with HC.

## 2023-01-10 NOTE — DISCHARGE NOTE NURSING/CASE MANAGEMENT/SOCIAL WORK - NSSCNAMETXT_GEN_ALL_CORE
[facility] care agency [phone] will reach out to you within 24-72 hours of your discharge to schedule home care visit/eval appointment with you. Please call agency for any queries regarding home care services

## 2023-01-18 ENCOUNTER — TRANSCRIPTION ENCOUNTER (OUTPATIENT)
Age: 58
End: 2023-01-18

## 2023-01-18 DIAGNOSIS — Z96.652 PRESENCE OF LEFT ARTIFICIAL KNEE JOINT: ICD-10-CM

## 2023-01-18 RX ORDER — OXYCODONE 5 MG/1
5 TABLET ORAL
Qty: 40 | Refills: 0 | Status: ACTIVE | COMMUNITY
Start: 2023-01-18 | End: 1900-01-01

## 2023-01-27 ENCOUNTER — APPOINTMENT (OUTPATIENT)
Dept: ORTHOPEDIC SURGERY | Facility: CLINIC | Age: 58
End: 2023-01-27
Payer: COMMERCIAL

## 2023-01-27 VITALS — DIASTOLIC BLOOD PRESSURE: 76 MMHG | HEART RATE: 99 BPM | SYSTOLIC BLOOD PRESSURE: 132 MMHG

## 2023-01-27 PROCEDURE — 99024 POSTOP FOLLOW-UP VISIT: CPT

## 2023-01-27 PROCEDURE — 73562 X-RAY EXAM OF KNEE 3: CPT | Mod: LT

## 2023-01-27 RX ORDER — OXYCODONE 5 MG/1
5 TABLET ORAL
Qty: 40 | Refills: 0 | Status: ACTIVE | COMMUNITY
Start: 2023-01-27 | End: 1900-01-01

## 2023-01-31 NOTE — HISTORY OF PRESENT ILLNESS
[de-identified] : Left total knee replacement 1/9/23 [de-identified] : JIMMY MARTINEZ  is an 57 year-year-old female presents today status post left total knee arthroplasty on 1/9/2023 for followup. The patient is living at home. The patient has maintained weight bearing as tolerated and hip precautions. The patient is ambulating with an assistive device and working with physical therapy. The patient iis weaning off all narcotic pain medicine. The patient is progressing well and is happy with the progress.\par \par No fever, chills, or signs of infection. Today, patient does not state any other associated signs or complaints outside of those described. The patient presents for postoperative followup.\par \par A complete review of symptoms as well as past medical/surgical history, medications, allergies, social and family history, and other details of HPI and exam were reviewed per first visit intake form and updated accordingly. Additional and more relevant details are noted in further detail today.\par  [de-identified] : General Appearance / Station: Well developed, well nourished, in no acute distress\par Orientation: Oriented to person, place, and time\par Gait & Station: Ambulates with assistive device\par Neurologic: Normal leg sensation\par Cardiovascular: Warm extremity\par Lymphatics: No lymphedema\par \par OPERATIVE KNEE\par Skin: incision clean, dry and intact. Well healing. Steri strips intact.  Steri-Strips removed and wound cleaned with ChloraPrep new Steri-Strips applied.  No erythema, warmth or tenderness.\par Range of motion: 0-100\par Strength: Within Normal Limits. Negative Trendelenburg sign              \par There is moderate calf swelling as well as tenderness to palpation of the calf                                                       \par Neurovascular Exam: Able to wiggle toes and dorsiflex/ plantarflex ankle. Foot warm, well perfused\par     \par \par \par  [de-identified] : Imaging: Three views of the left knee show satisfactory placement of a left cemented total knee arthroplasty. There are no changes in alignment of hardware and no signs of radiographic failure compared to previous radiographs.\par \par  [de-identified] : Now approximately 2 weeks since left TKA. Overall doing well although she does have significant calf swelling. \par \par I would like to obtain an ultrasound of the lower extremity veins to rule out a blood clot given her significant calf swelling and pain.  I explained to her that if this were to happen then she would likely require a stronger dose of anticoagulation.  Explained to her that if she does develop chest pain or shortness of breath I would like her to go to the emergency department immediately.  I have ordered an ultrasound and will follow up the results of that once she has completed the study.  I explained to her that I would like her to hold off on starting physical therapy until the ultrasound is complete been completed but I will give her a prescription for physical therapy for her to use once the ultrasound is complete and we have reviewed it.\par \par They know to complete the prescribed four weeks of DVT prophylaxis in the interim\par \par We have also counseled the patient regarding antibiotics for dental prophylaxis. We remain available for any further questions and concerns and instructed patient that we hollis like to see them if any concerns for infection including fevers, redness, or increased swelling.\par

## 2023-02-13 ENCOUNTER — TRANSCRIPTION ENCOUNTER (OUTPATIENT)
Age: 58
End: 2023-02-13

## 2023-02-24 RX ORDER — OXYCODONE 5 MG/1
5 TABLET ORAL
Qty: 42 | Refills: 0 | Status: ACTIVE | COMMUNITY
Start: 2023-02-13

## 2023-03-24 ENCOUNTER — APPOINTMENT (OUTPATIENT)
Dept: ORTHOPEDIC SURGERY | Facility: CLINIC | Age: 58
End: 2023-03-24
Payer: COMMERCIAL

## 2023-03-24 VITALS — SYSTOLIC BLOOD PRESSURE: 126 MMHG | DIASTOLIC BLOOD PRESSURE: 84 MMHG | HEART RATE: 85 BPM

## 2023-03-24 PROCEDURE — 73562 X-RAY EXAM OF KNEE 3: CPT | Mod: LT

## 2023-03-24 PROCEDURE — 99024 POSTOP FOLLOW-UP VISIT: CPT

## 2023-07-07 ENCOUNTER — APPOINTMENT (OUTPATIENT)
Dept: ORTHOPEDIC SURGERY | Facility: CLINIC | Age: 58
End: 2023-07-07
Payer: COMMERCIAL

## 2023-07-07 VITALS — SYSTOLIC BLOOD PRESSURE: 108 MMHG | DIASTOLIC BLOOD PRESSURE: 75 MMHG | HEART RATE: 87 BPM

## 2023-07-07 PROCEDURE — 73562 X-RAY EXAM OF KNEE 3: CPT | Mod: LT

## 2023-07-07 PROCEDURE — 20610 DRAIN/INJ JOINT/BURSA W/O US: CPT | Mod: RT

## 2023-07-07 PROCEDURE — 99213 OFFICE O/P EST LOW 20 MIN: CPT | Mod: 25

## 2023-07-07 NOTE — HISTORY OF PRESENT ILLNESS
[de-identified] : JIMMY MARTINEZ  is an 58 year-old female presents today status post left total knee arthroplasty on 1/9/23 for followup. The patient is living at home. The patient has maintained weight bearing as tolerated. The patient is ambulating with no assistive device and working with physical therapy. The patient has weaned off all narcotic pain medicine. The patient is progressing well and is happy with the progress.  She is experiencing right knee pain and is considering a possible knee replacement but does not want to go forward with a right\par \par No fever, chills, or signs of infection. Today, patient does not state any other associated signs or complaints outside of those described. The patient presents for postoperative followup.\par \par A complete review of symptoms as well as past medical/surgical history, medications, allergies, social and family history, and other details of HPI and exam were reviewed per first visit intake form and updated accordingly. Additional and more relevant details are noted in further detail today.\par

## 2023-07-07 NOTE — PHYSICAL EXAM
[de-identified] : General Appearance / Station: Well developed, well nourished, in no acute distress\par Orientation: Oriented to person, place, and time\par Gait & Station: Ambulates with assistive device\par Neurologic: Normal leg sensation\par Cardiovascular: Warm extremity\par Lymphatics: No lymphedema\par \par OPERATIVE KNEE\par Skin: incision clean, dry and intact. Well healed. No erythema, warmth or tenderness.\par Range of motion: 0-120\par Strength: Within Normal Limits. Negative Trendelenburg sign                                                                     \par Neurovascular Exam: Able to wiggle toes and dorsiflex/ plantarflex ankle. Foot warm, well perfused\par     \par \par SYMPTOMATIC RIGHT KNEE:\par Alignment: VARUS \par Skin: normal\par Effusion: none .\par Quadriceps: normal .\par Range of motion: symmetrical but painful .\par PF crepitus: 1+.\par PF apprehension: none .\par Patella / Patella Tendon: nontender .\par Lachman's: negative \par Valgus @ 30°: negative.\par Varus @ 30°: negative.\par Posterior drawer: negative.\par Palpation: TENDER AT MEDIAL AND LATERAL JOINT LINE.\par Meniscus signs: MEDIAL JOINT LINE\par \par \par \par  [de-identified] : Imaging: Three views of the left knee show satisfactory placement of a left cemented total knee arthroplasty. There are no changes in alignment of hardware and no signs of radiographic failure compared to previous radiographs.\par

## 2023-07-07 NOTE — DISCUSSION/SUMMARY
[de-identified] : Now approximately next month since left TKA. Overall doing well.  She still reports some intermittent buckling and I would like her to continue with quadricep strengthening.  She would prefer to do home exercises and she was given a packet to perform.  Regarding her right knee she is strongly considering a right knee replacement however does not want to do it through the summer and she was given a steroid injection today.\par \par At this point we have suggested continued exercises and return to normal activity Appropriate instructions regarding further care, restrictions, and further recovery has been given. \par \par We have also counseled the patient to avoid any dental procedures for the first three months postoperatively.. We remain available for any further questions and concerns and instructed patient that we hollis like to see them if any concerns for infection including fevers, redness, or increased swelling.\par

## 2023-07-07 NOTE — PROCEDURE
[de-identified] : Injection: Right knee joint. Indication: Osteoarthritis.  \par A discussion was had with the patient regarding this procedure and all questions were answered. All risks, benefits and alternatives were discussed. These included but were not limited to bleeding, infection, allergic reaction and reaccumulation of fluid. A timeout was done to ensure correct side and patient agreed to the procedure.  A Napaimute andria was created on the skin utilizing a plastic needle cap to andria the anticipated point of entry. Alcohol was used to clean the skin, and chloraprep was used to sterilize and prep the area in the lateral joint line aspect of the knee. Ethyl chloride spray was then used as a topical anesthetic. A 22-gauge needle was used to inject 4cc 1% lidocaine without epinephrine  and 1cc of 40mg/ml methylprednisolone into the knee. A sterile bandage was then applied. The patient tolerated the procedure well. \par

## 2023-09-21 ASSESSMENT — KOOS JR
BENDING TO THE FLOOR TO PICK UP OBJECT: SEVERE
IMPORTED FORM: YES
HOW SEVERE IS YOUR KNEE STIFFNESS AFTER FIRST WAKING IN MORNING: SEVERE
RISING FROM SITTING: SEVERE
TWISING OR PIVOTING ON KNEE: SEVERE
STANDING UPRIGHT: SEVERE
STRAIGHTENING KNEE FULLY: SEVERE
GOING UP OR DOWN STAIRS: SEVERE
KOOS JR RAW SCORE: 21
IMPORTED KOOS JR SCORE: 21.0
IMPORTED LATERALITY: LEFT

## 2023-10-06 ENCOUNTER — APPOINTMENT (OUTPATIENT)
Dept: ORTHOPEDIC SURGERY | Facility: CLINIC | Age: 58
End: 2023-10-06
Payer: COMMERCIAL

## 2023-10-06 DIAGNOSIS — M25.569 PAIN IN UNSPECIFIED KNEE: ICD-10-CM

## 2023-10-06 DIAGNOSIS — M17.11 UNILATERAL PRIMARY OSTEOARTHRITIS, RIGHT KNEE: ICD-10-CM

## 2023-10-06 PROCEDURE — 73564 X-RAY EXAM KNEE 4 OR MORE: CPT | Mod: RT

## 2023-10-06 PROCEDURE — 99215 OFFICE O/P EST HI 40 MIN: CPT

## 2023-10-06 PROCEDURE — 73562 X-RAY EXAM OF KNEE 3: CPT | Mod: LT

## 2023-10-08 PROBLEM — M17.11 ARTHRITIS OF KNEE, RIGHT: Status: ACTIVE | Noted: 2023-10-08

## 2023-10-11 ENCOUNTER — TRANSCRIPTION ENCOUNTER (OUTPATIENT)
Age: 58
End: 2023-10-11

## 2023-10-11 RX ORDER — AMOXICILLIN 500 MG/1
500 CAPSULE ORAL
Qty: 12 | Refills: 2 | Status: ACTIVE | COMMUNITY
Start: 2023-10-11 | End: 1900-01-01

## 2023-10-12 ENCOUNTER — TRANSCRIPTION ENCOUNTER (OUTPATIENT)
Age: 58
End: 2023-10-12

## 2023-10-16 ENCOUNTER — TRANSCRIPTION ENCOUNTER (OUTPATIENT)
Age: 58
End: 2023-10-16

## 2023-12-05 NOTE — DISCHARGE NOTE NURSING/CASE MANAGEMENT/SOCIAL WORK - NSDCPETBCESMAN_GEN_ALL_CORE
Assessment/Plan:    1. Croup  -     prednisoLONE (ORAPRED) 15 mg/5 mL oral solution; Take 5.4 mL (16.2 mg total) by mouth daily for 5 days        Subjective:     History provided by:  grandmother    Patient ID: Lluvia Ramirez is a 1 y.o. female    Ange Cassidy was seen in room 3 with grandmother as the historian. She has a croupy cough which stated last night. She denies wheezing or fever. Will use warm mist then cool air x 10 minutes if stridor develops. Will start a steroid. Cough  Pertinent negatives include no chest pain, chills, ear pain, eye redness, fever, rash, sore throat or wheezing. The following portions of the patient's history were reviewed and updated as appropriate: allergies, current medications, past family history, past medical history, past social history, past surgical history, and problem list.    Review of Systems   Constitutional:  Positive for fatigue. Negative for chills and fever. HENT:  Negative for congestion, ear pain and sore throat. Eyes:  Negative for pain and redness. Respiratory:  Positive for cough. Negative for wheezing. Cardiovascular:  Negative for chest pain and leg swelling. Gastrointestinal:  Negative for abdominal pain and vomiting. Genitourinary:  Negative for frequency and hematuria. Musculoskeletal:  Negative for gait problem and joint swelling. Skin:  Negative for color change and rash. Neurological:  Negative for seizures and syncope. All other systems reviewed and are negative. Objective:    Vitals:    12/05/23 1516   Pulse: 91   Temp: 98.4 °F (36.9 °C)   SpO2: 99%   Weight: 16.1 kg (35 lb 9.6 oz)       Physical Exam  Vitals reviewed. Constitutional:       General: She is active. Appearance: Normal appearance. She is well-developed and normal weight. HENT:      Head: Normocephalic and atraumatic. Right Ear: Tympanic membrane, ear canal and external ear normal. Tympanic membrane is not erythematous.       Left Ear: Tympanic membrane, ear canal and external ear normal. Tympanic membrane is not erythematous. Nose: Rhinorrhea present. Mouth/Throat:      Mouth: Mucous membranes are moist.   Eyes:      General: Red reflex is present bilaterally. Extraocular Movements: Extraocular movements intact. Conjunctiva/sclera: Conjunctivae normal.      Pupils: Pupils are equal, round, and reactive to light. Cardiovascular:      Rate and Rhythm: Normal rate and regular rhythm. Pulses: Normal pulses. Heart sounds: Normal heart sounds. No murmur heard. Pulmonary:      Effort: Pulmonary effort is normal.      Breath sounds: Normal breath sounds. No wheezing. Comments: Frequent coughing  Abdominal:      General: Abdomen is flat. Bowel sounds are normal.      Palpations: Abdomen is soft. Musculoskeletal:         General: Normal range of motion. Cervical back: Normal range of motion and neck supple. Skin:     General: Skin is warm. Capillary Refill: Capillary refill takes less than 2 seconds. Findings: No rash. Neurological:      General: No focal deficit present. Mental Status: She is alert and oriented for age. If you are a smoker, it is important for your health to stop smoking. Please be aware that second hand smoke is also harmful.

## 2023-12-10 ENCOUNTER — NON-APPOINTMENT (OUTPATIENT)
Age: 58
End: 2023-12-10

## 2024-01-21 ENCOUNTER — NON-APPOINTMENT (OUTPATIENT)
Age: 59
End: 2024-01-21

## 2024-01-22 ENCOUNTER — OUTPATIENT (OUTPATIENT)
Dept: OUTPATIENT SERVICES | Facility: HOSPITAL | Age: 59
LOS: 1 days | End: 2024-01-22
Payer: COMMERCIAL

## 2024-01-22 VITALS
WEIGHT: 205.91 LBS | SYSTOLIC BLOOD PRESSURE: 108 MMHG | HEIGHT: 66 IN | DIASTOLIC BLOOD PRESSURE: 74 MMHG | OXYGEN SATURATION: 96 % | HEART RATE: 90 BPM | RESPIRATION RATE: 16 BRPM | TEMPERATURE: 98 F

## 2024-01-22 DIAGNOSIS — Z98.890 OTHER SPECIFIED POSTPROCEDURAL STATES: Chronic | ICD-10-CM

## 2024-01-22 DIAGNOSIS — M17.11 UNILATERAL PRIMARY OSTEOARTHRITIS, RIGHT KNEE: ICD-10-CM

## 2024-01-22 DIAGNOSIS — Z98.1 ARTHRODESIS STATUS: Chronic | ICD-10-CM

## 2024-01-22 DIAGNOSIS — Z96.652 PRESENCE OF LEFT ARTIFICIAL KNEE JOINT: Chronic | ICD-10-CM

## 2024-01-22 DIAGNOSIS — Z90.49 ACQUIRED ABSENCE OF OTHER SPECIFIED PARTS OF DIGESTIVE TRACT: Chronic | ICD-10-CM

## 2024-01-22 DIAGNOSIS — Z01.818 ENCOUNTER FOR OTHER PREPROCEDURAL EXAMINATION: ICD-10-CM

## 2024-01-22 LAB
A1C WITH ESTIMATED AVERAGE GLUCOSE RESULT: 5 % — SIGNIFICANT CHANGE UP (ref 4–5.6)
ALBUMIN SERPL ELPH-MCNC: 4.2 G/DL — SIGNIFICANT CHANGE UP (ref 3.3–5)
ALP SERPL-CCNC: 72 U/L — SIGNIFICANT CHANGE UP (ref 30–120)
ALT FLD-CCNC: 34 U/L — SIGNIFICANT CHANGE UP (ref 10–60)
ANION GAP SERPL CALC-SCNC: 11 MMOL/L — SIGNIFICANT CHANGE UP (ref 5–17)
APTT BLD: 34.2 SEC — SIGNIFICANT CHANGE UP (ref 24.5–35.6)
AST SERPL-CCNC: 24 U/L — SIGNIFICANT CHANGE UP (ref 10–40)
BILIRUB SERPL-MCNC: 0.5 MG/DL — SIGNIFICANT CHANGE UP (ref 0.2–1.2)
BLD GP AB SCN SERPL QL: SIGNIFICANT CHANGE UP
BUN SERPL-MCNC: 18 MG/DL — SIGNIFICANT CHANGE UP (ref 7–23)
CALCIUM SERPL-MCNC: 9.3 MG/DL — SIGNIFICANT CHANGE UP (ref 8.4–10.5)
CHLORIDE SERPL-SCNC: 105 MMOL/L — SIGNIFICANT CHANGE UP (ref 96–108)
CO2 SERPL-SCNC: 25 MMOL/L — SIGNIFICANT CHANGE UP (ref 22–31)
CREAT SERPL-MCNC: 0.81 MG/DL — SIGNIFICANT CHANGE UP (ref 0.5–1.3)
EGFR: 84 ML/MIN/1.73M2 — SIGNIFICANT CHANGE UP
ESTIMATED AVERAGE GLUCOSE: 97 MG/DL — SIGNIFICANT CHANGE UP (ref 68–114)
GLUCOSE SERPL-MCNC: 119 MG/DL — HIGH (ref 70–99)
HCT VFR BLD CALC: 40.6 % — SIGNIFICANT CHANGE UP (ref 34.5–45)
HGB BLD-MCNC: 13.4 G/DL — SIGNIFICANT CHANGE UP (ref 11.5–15.5)
INR BLD: 1.14 RATIO — SIGNIFICANT CHANGE UP (ref 0.85–1.18)
MCHC RBC-ENTMCNC: 30 PG — SIGNIFICANT CHANGE UP (ref 27–34)
MCHC RBC-ENTMCNC: 33 GM/DL — SIGNIFICANT CHANGE UP (ref 32–36)
MCV RBC AUTO: 90.8 FL — SIGNIFICANT CHANGE UP (ref 80–100)
MRSA PCR RESULT.: SIGNIFICANT CHANGE UP
NRBC # BLD: 0 /100 WBCS — SIGNIFICANT CHANGE UP (ref 0–0)
PLATELET # BLD AUTO: 211 K/UL — SIGNIFICANT CHANGE UP (ref 150–400)
POTASSIUM SERPL-MCNC: 4.3 MMOL/L — SIGNIFICANT CHANGE UP (ref 3.5–5.3)
POTASSIUM SERPL-SCNC: 4.3 MMOL/L — SIGNIFICANT CHANGE UP (ref 3.5–5.3)
PROT SERPL-MCNC: 7.5 G/DL — SIGNIFICANT CHANGE UP (ref 6–8.3)
PROTHROM AB SERPL-ACNC: 12.7 SEC — SIGNIFICANT CHANGE UP (ref 9.5–13)
RBC # BLD: 4.47 M/UL — SIGNIFICANT CHANGE UP (ref 3.8–5.2)
RBC # FLD: 12.7 % — SIGNIFICANT CHANGE UP (ref 10.3–14.5)
S AUREUS DNA NOSE QL NAA+PROBE: SIGNIFICANT CHANGE UP
SODIUM SERPL-SCNC: 141 MMOL/L — SIGNIFICANT CHANGE UP (ref 135–145)
WBC # BLD: 4.26 K/UL — SIGNIFICANT CHANGE UP (ref 3.8–10.5)
WBC # FLD AUTO: 4.26 K/UL — SIGNIFICANT CHANGE UP (ref 3.8–10.5)

## 2024-01-22 PROCEDURE — G0463: CPT

## 2024-01-22 PROCEDURE — 93010 ELECTROCARDIOGRAM REPORT: CPT

## 2024-01-22 PROCEDURE — 93005 ELECTROCARDIOGRAM TRACING: CPT

## 2024-01-22 RX ORDER — LEVOTHYROXINE SODIUM 125 MCG
1 TABLET ORAL
Qty: 0 | Refills: 0 | DISCHARGE

## 2024-01-22 RX ORDER — TOPIRAMATE 25 MG
0 TABLET ORAL
Qty: 0 | Refills: 0 | DISCHARGE

## 2024-01-22 RX ORDER — PHENTERMINE HCL 30 MG
1 CAPSULE ORAL
Qty: 0 | Refills: 0 | DISCHARGE

## 2024-01-22 RX ORDER — SERTRALINE 25 MG/1
1 TABLET, FILM COATED ORAL
Qty: 0 | Refills: 0 | DISCHARGE

## 2024-01-22 RX ORDER — SEMAGLUTIDE 0.68 MG/ML
0 INJECTION, SOLUTION SUBCUTANEOUS
Qty: 0 | Refills: 0 | DISCHARGE

## 2024-01-22 NOTE — H&P PST ADULT - NSICDXPASTMEDICALHX_GEN_ALL_CORE_FT
PAST MEDICAL HISTORY:  2019 novel coronavirus disease (COVID-19)     Anxiety and depression     Bipolar depression     Class 1 obesity with body mass index (BMI) of 33.0 to 33.9 in adult     HLD (hyperlipidemia)     Hypothyroidism     Osteoarthritis of right knee     PONV (postoperative nausea and vomiting)

## 2024-01-22 NOTE — H&P PST ADULT - ATTENDING COMMENTS
The discussion regarding options for nonoperative and operative management was introduced through a patient shared decision making protocol. The benefits of surgery versus the risks were discussed with the patient.  Risks of surgery include medical complications of anesthesia, DVT, pulmonary embolism, hardware complications, need for revision surgery, infection, bleeding, need for transfusion, neurovascular injury including leg numbness, fracture, chronic pain, stiffness and scarring, and limb length discrepancy were addressed with the patient. The patient appeared to understand the ramifications of surgery and had ample time for questions, then consenting for a right total knee replacement

## 2024-01-22 NOTE — H&P PST ADULT - MUSCULOSKELETAL
no joint warmth/no calf tenderness/decreased ROM due to pain/no chest wall tenderness/extremities exam details…

## 2024-01-22 NOTE — H&P PST ADULT - PROBLEM SELECTOR PLAN 1
Right total knee replacement is planned for 2/5/2024  Diagnostic testing performed  Medical clearance requested  Joint replacement protocols were reviewed including pre op instructions   patient will discontinue ozempic 1/24/2024  Best wishes offered

## 2024-01-22 NOTE — H&P PST ADULT - HISTORY OF PRESENT ILLNESS
59 yo female reports right knee pain for many years for which she has tried HA and cortisone injections without relief.  Her pain rates 8/10 at worst with weight bearing.  She is scheduled for right total knee replacement on 2/5/2024 @ [facility].

## 2024-01-22 NOTE — H&P PST ADULT - NSICDXPASTSURGICALHX_GEN_ALL_CORE_FT
PAST SURGICAL HISTORY:  History of left knee replacement     S/P appendectomy     S/P bilateral breast reduction     S/P breast biopsy     S/P cervical spinal fusion     S/P cholecystectomy     S/P lumpectomy, left breast

## 2024-01-31 ENCOUNTER — NON-APPOINTMENT (OUTPATIENT)
Age: 59
End: 2024-01-31

## 2024-01-31 PROBLEM — R11.2 NAUSEA WITH VOMITING, UNSPECIFIED: Chronic | Status: ACTIVE | Noted: 2024-01-22

## 2024-01-31 PROBLEM — M17.11 UNILATERAL PRIMARY OSTEOARTHRITIS, RIGHT KNEE: Chronic | Status: ACTIVE | Noted: 2024-01-22

## 2024-01-31 PROBLEM — E66.9 OBESITY, UNSPECIFIED: Chronic | Status: ACTIVE | Noted: 2024-01-22

## 2024-01-31 PROBLEM — E03.9 HYPOTHYROIDISM, UNSPECIFIED: Chronic | Status: ACTIVE | Noted: 2024-01-22

## 2024-02-04 ENCOUNTER — TRANSCRIPTION ENCOUNTER (OUTPATIENT)
Age: 59
End: 2024-02-04

## 2024-02-05 ENCOUNTER — INPATIENT (INPATIENT)
Facility: HOSPITAL | Age: 59
LOS: 0 days | Discharge: HOME | DRG: 470 | End: 2024-02-06
Attending: STUDENT IN AN ORGANIZED HEALTH CARE EDUCATION/TRAINING PROGRAM | Admitting: STUDENT IN AN ORGANIZED HEALTH CARE EDUCATION/TRAINING PROGRAM
Payer: COMMERCIAL

## 2024-02-05 ENCOUNTER — APPOINTMENT (OUTPATIENT)
Dept: ORTHOPEDIC SURGERY | Facility: HOSPITAL | Age: 59
End: 2024-02-05

## 2024-02-05 ENCOUNTER — TRANSCRIPTION ENCOUNTER (OUTPATIENT)
Age: 59
End: 2024-02-05

## 2024-02-05 VITALS
HEART RATE: 80 BPM | OXYGEN SATURATION: 96 % | WEIGHT: 210.76 LBS | RESPIRATION RATE: 20 BRPM | HEIGHT: 66 IN | TEMPERATURE: 98 F | SYSTOLIC BLOOD PRESSURE: 110 MMHG | DIASTOLIC BLOOD PRESSURE: 60 MMHG

## 2024-02-05 DIAGNOSIS — Z90.49 ACQUIRED ABSENCE OF OTHER SPECIFIED PARTS OF DIGESTIVE TRACT: Chronic | ICD-10-CM

## 2024-02-05 DIAGNOSIS — Z96.652 PRESENCE OF LEFT ARTIFICIAL KNEE JOINT: Chronic | ICD-10-CM

## 2024-02-05 DIAGNOSIS — M17.11 UNILATERAL PRIMARY OSTEOARTHRITIS, RIGHT KNEE: ICD-10-CM

## 2024-02-05 DIAGNOSIS — Z98.890 OTHER SPECIFIED POSTPROCEDURAL STATES: Chronic | ICD-10-CM

## 2024-02-05 DIAGNOSIS — Z98.1 ARTHRODESIS STATUS: Chronic | ICD-10-CM

## 2024-02-05 PROCEDURE — 73560 X-RAY EXAM OF KNEE 1 OR 2: CPT | Mod: 26,RT

## 2024-02-05 PROCEDURE — 20985 CPTR-ASST DIR MS PX: CPT

## 2024-02-05 PROCEDURE — 27447 TOTAL KNEE ARTHROPLASTY: CPT | Mod: AS,RT

## 2024-02-05 PROCEDURE — 99222 1ST HOSP IP/OBS MODERATE 55: CPT

## 2024-02-05 PROCEDURE — 27447 TOTAL KNEE ARTHROPLASTY: CPT | Mod: RT

## 2024-02-05 DEVICE — COMP FEM NON POROUS SZ 6 RT: Type: IMPLANTABLE DEVICE | Status: FUNCTIONAL

## 2024-02-05 DEVICE — COMP PATELLA TRI-PEG E-PLUS POLY 8X32MM: Type: IMPLANTABLE DEVICE | Status: FUNCTIONAL

## 2024-02-05 DEVICE — PIN STERILE: Type: IMPLANTABLE DEVICE | Status: FUNCTIONAL

## 2024-02-05 DEVICE — STEM MOD UNIV TIB 10X40MM: Type: IMPLANTABLE DEVICE | Status: FUNCTIONAL

## 2024-02-05 DEVICE — IMPLANTABLE DEVICE: Type: IMPLANTABLE DEVICE | Status: FUNCTIONAL

## 2024-02-05 DEVICE — INSERT TIB NONPOROUS UNIV SZ 5 RT: Type: IMPLANTABLE DEVICE | Status: FUNCTIONAL

## 2024-02-05 DEVICE — INSERT TIB EMPOWR VVC SZ 5 12MM: Type: IMPLANTABLE DEVICE | Status: FUNCTIONAL

## 2024-02-05 RX ORDER — HYDROMORPHONE HYDROCHLORIDE 2 MG/ML
0.5 INJECTION INTRAMUSCULAR; INTRAVENOUS; SUBCUTANEOUS
Refills: 0 | Status: DISCONTINUED | OUTPATIENT
Start: 2024-02-05 | End: 2024-02-05

## 2024-02-05 RX ORDER — QUETIAPINE FUMARATE 200 MG/1
200 TABLET, FILM COATED ORAL AT BEDTIME
Refills: 0 | Status: DISCONTINUED | OUTPATIENT
Start: 2024-02-05 | End: 2024-02-06

## 2024-02-05 RX ORDER — LEVOTHYROXINE SODIUM 125 MCG
100 TABLET ORAL DAILY
Refills: 0 | Status: DISCONTINUED | OUTPATIENT
Start: 2024-02-05 | End: 2024-02-06

## 2024-02-05 RX ORDER — CEFAZOLIN SODIUM 1 G
2000 VIAL (EA) INJECTION ONCE
Refills: 0 | Status: COMPLETED | OUTPATIENT
Start: 2024-02-05 | End: 2024-02-05

## 2024-02-05 RX ORDER — SODIUM CHLORIDE 9 MG/ML
500 INJECTION INTRAMUSCULAR; INTRAVENOUS; SUBCUTANEOUS ONCE
Refills: 0 | Status: COMPLETED | OUTPATIENT
Start: 2024-02-05 | End: 2024-02-05

## 2024-02-05 RX ORDER — TRANEXAMIC ACID 100 MG/ML
1000 INJECTION, SOLUTION INTRAVENOUS ONCE
Refills: 0 | Status: COMPLETED | OUTPATIENT
Start: 2024-02-05 | End: 2024-02-05

## 2024-02-05 RX ORDER — CELECOXIB 200 MG/1
1 CAPSULE ORAL
Qty: 42 | Refills: 0
Start: 2024-02-05 | End: 2024-02-25

## 2024-02-05 RX ORDER — SENNA PLUS 8.6 MG/1
2 TABLET ORAL AT BEDTIME
Refills: 0 | Status: DISCONTINUED | OUTPATIENT
Start: 2024-02-05 | End: 2024-02-06

## 2024-02-05 RX ORDER — ONDANSETRON 8 MG/1
4 TABLET, FILM COATED ORAL ONCE
Refills: 0 | Status: DISCONTINUED | OUTPATIENT
Start: 2024-02-05 | End: 2024-02-05

## 2024-02-05 RX ORDER — EZETIMIBE 10 MG/1
0 TABLET ORAL
Qty: 0 | Refills: 0 | DISCHARGE

## 2024-02-05 RX ORDER — SODIUM CHLORIDE 9 MG/ML
1000 INJECTION, SOLUTION INTRAVENOUS
Refills: 0 | Status: DISCONTINUED | OUTPATIENT
Start: 2024-02-05 | End: 2024-02-05

## 2024-02-05 RX ORDER — SERTRALINE 25 MG/1
50 TABLET, FILM COATED ORAL DAILY
Refills: 0 | Status: DISCONTINUED | OUTPATIENT
Start: 2024-02-05 | End: 2024-02-06

## 2024-02-05 RX ORDER — COLESEVELAM HYDROCHLORIDE 625 MG/1
3 TABLET, FILM COATED ORAL
Refills: 0 | DISCHARGE

## 2024-02-05 RX ORDER — POLYETHYLENE GLYCOL 3350 17 G/17G
17 POWDER, FOR SOLUTION ORAL AT BEDTIME
Refills: 0 | Status: DISCONTINUED | OUTPATIENT
Start: 2024-02-05 | End: 2024-02-06

## 2024-02-05 RX ORDER — ACETAMINOPHEN 500 MG
1000 TABLET ORAL ONCE
Refills: 0 | Status: COMPLETED | OUTPATIENT
Start: 2024-02-05 | End: 2024-02-05

## 2024-02-05 RX ORDER — HYDROMORPHONE HYDROCHLORIDE 2 MG/ML
0.2 INJECTION INTRAMUSCULAR; INTRAVENOUS; SUBCUTANEOUS
Refills: 0 | Status: DISCONTINUED | OUTPATIENT
Start: 2024-02-05 | End: 2024-02-05

## 2024-02-05 RX ORDER — ERGOCALCIFEROL 1.25 MG/1
0 CAPSULE ORAL
Qty: 0 | Refills: 0 | DISCHARGE

## 2024-02-05 RX ORDER — LEVOTHYROXINE SODIUM 125 MCG
1 TABLET ORAL
Refills: 0 | DISCHARGE

## 2024-02-05 RX ORDER — BEMPEDOIC ACID 180 MG/1
1 TABLET, FILM COATED ORAL
Refills: 0 | DISCHARGE

## 2024-02-05 RX ORDER — SODIUM CHLORIDE 9 MG/ML
1000 INJECTION, SOLUTION INTRAVENOUS
Refills: 0 | Status: DISCONTINUED | OUTPATIENT
Start: 2024-02-05 | End: 2024-02-06

## 2024-02-05 RX ORDER — DEXAMETHASONE 0.5 MG/5ML
8 ELIXIR ORAL ONCE
Refills: 0 | Status: COMPLETED | OUTPATIENT
Start: 2024-02-06 | End: 2024-02-06

## 2024-02-05 RX ORDER — APREPITANT 80 MG/1
40 CAPSULE ORAL ONCE
Refills: 0 | Status: COMPLETED | OUTPATIENT
Start: 2024-02-05 | End: 2024-02-05

## 2024-02-05 RX ORDER — OXYCODONE HYDROCHLORIDE 5 MG/1
10 TABLET ORAL
Refills: 0 | Status: DISCONTINUED | OUTPATIENT
Start: 2024-02-05 | End: 2024-02-06

## 2024-02-05 RX ORDER — OXYCODONE HYDROCHLORIDE 5 MG/1
5 TABLET ORAL
Refills: 0 | Status: DISCONTINUED | OUTPATIENT
Start: 2024-02-05 | End: 2024-02-06

## 2024-02-05 RX ORDER — HYDROMORPHONE HYDROCHLORIDE 2 MG/ML
2 INJECTION INTRAMUSCULAR; INTRAVENOUS; SUBCUTANEOUS ONCE
Refills: 0 | Status: DISCONTINUED | OUTPATIENT
Start: 2024-02-05 | End: 2024-02-05

## 2024-02-05 RX ORDER — MAGNESIUM HYDROXIDE 400 MG/1
30 TABLET, CHEWABLE ORAL DAILY
Refills: 0 | Status: DISCONTINUED | OUTPATIENT
Start: 2024-02-05 | End: 2024-02-06

## 2024-02-05 RX ORDER — ASPIRIN/CALCIUM CARB/MAGNESIUM 324 MG
1 TABLET ORAL
Qty: 60 | Refills: 0
Start: 2024-02-05 | End: 2024-03-05

## 2024-02-05 RX ORDER — ACETAMINOPHEN 500 MG
1000 TABLET ORAL EVERY 8 HOURS
Refills: 0 | Status: DISCONTINUED | OUTPATIENT
Start: 2024-02-05 | End: 2024-02-06

## 2024-02-05 RX ORDER — SEMAGLUTIDE 0.68 MG/ML
0.5 INJECTION, SOLUTION SUBCUTANEOUS
Refills: 0 | DISCHARGE

## 2024-02-05 RX ORDER — CHLORHEXIDINE GLUCONATE 213 G/1000ML
1 SOLUTION TOPICAL ONCE
Refills: 0 | Status: COMPLETED | OUTPATIENT
Start: 2024-02-05 | End: 2024-02-05

## 2024-02-05 RX ORDER — CEFAZOLIN SODIUM 1 G
2000 VIAL (EA) INJECTION EVERY 8 HOURS
Refills: 0 | Status: COMPLETED | OUTPATIENT
Start: 2024-02-05 | End: 2024-02-06

## 2024-02-05 RX ORDER — QUETIAPINE FUMARATE 200 MG/1
0 TABLET, FILM COATED ORAL
Qty: 0 | Refills: 0 | DISCHARGE

## 2024-02-05 RX ORDER — HYDROMORPHONE HYDROCHLORIDE 2 MG/ML
0.5 INJECTION INTRAMUSCULAR; INTRAVENOUS; SUBCUTANEOUS
Refills: 0 | Status: DISCONTINUED | OUTPATIENT
Start: 2024-02-05 | End: 2024-02-06

## 2024-02-05 RX ORDER — ASPIRIN/CALCIUM CARB/MAGNESIUM 324 MG
81 TABLET ORAL EVERY 12 HOURS
Refills: 0 | Status: DISCONTINUED | OUTPATIENT
Start: 2024-02-06 | End: 2024-02-06

## 2024-02-05 RX ORDER — COLESEVELAM HYDROCHLORIDE 625 MG/1
1875 TABLET, FILM COATED ORAL
Refills: 0 | Status: DISCONTINUED | OUTPATIENT
Start: 2024-02-05 | End: 2024-02-06

## 2024-02-05 RX ORDER — QUETIAPINE FUMARATE 200 MG/1
400 TABLET, FILM COATED ORAL AT BEDTIME
Refills: 0 | Status: DISCONTINUED | OUTPATIENT
Start: 2024-02-05 | End: 2024-02-06

## 2024-02-05 RX ORDER — QUETIAPINE FUMARATE 200 MG/1
1 TABLET, FILM COATED ORAL
Qty: 0 | Refills: 0 | DISCHARGE

## 2024-02-05 RX ORDER — SERTRALINE 25 MG/1
1 TABLET, FILM COATED ORAL
Refills: 0 | DISCHARGE

## 2024-02-05 RX ORDER — CELECOXIB 200 MG/1
200 CAPSULE ORAL EVERY 12 HOURS
Refills: 0 | Status: DISCONTINUED | OUTPATIENT
Start: 2024-02-06 | End: 2024-02-06

## 2024-02-05 RX ORDER — ONDANSETRON 8 MG/1
4 TABLET, FILM COATED ORAL EVERY 6 HOURS
Refills: 0 | Status: DISCONTINUED | OUTPATIENT
Start: 2024-02-05 | End: 2024-02-06

## 2024-02-05 RX ORDER — EZETIMIBE 10 MG/1
10 TABLET ORAL DAILY
Refills: 0 | Status: DISCONTINUED | OUTPATIENT
Start: 2024-02-05 | End: 2024-02-06

## 2024-02-05 RX ADMIN — HYDROMORPHONE HYDROCHLORIDE 0.5 MILLIGRAM(S): 2 INJECTION INTRAMUSCULAR; INTRAVENOUS; SUBCUTANEOUS at 21:13

## 2024-02-05 RX ADMIN — SODIUM CHLORIDE 500 MILLILITER(S): 9 INJECTION INTRAMUSCULAR; INTRAVENOUS; SUBCUTANEOUS at 13:19

## 2024-02-05 RX ADMIN — CHLORHEXIDINE GLUCONATE 1 APPLICATION(S): 213 SOLUTION TOPICAL at 08:24

## 2024-02-05 RX ADMIN — QUETIAPINE FUMARATE 200 MILLIGRAM(S): 200 TABLET, FILM COATED ORAL at 22:12

## 2024-02-05 RX ADMIN — Medication 1000 MILLIGRAM(S): at 22:19

## 2024-02-05 RX ADMIN — HYDROMORPHONE HYDROCHLORIDE 0.5 MILLIGRAM(S): 2 INJECTION INTRAMUSCULAR; INTRAVENOUS; SUBCUTANEOUS at 20:58

## 2024-02-05 RX ADMIN — Medication 1000 MILLIGRAM(S): at 17:54

## 2024-02-05 RX ADMIN — SODIUM CHLORIDE 125 MILLILITER(S): 9 INJECTION, SOLUTION INTRAVENOUS at 20:58

## 2024-02-05 RX ADMIN — SENNA PLUS 2 TABLET(S): 8.6 TABLET ORAL at 22:13

## 2024-02-05 RX ADMIN — QUETIAPINE FUMARATE 400 MILLIGRAM(S): 200 TABLET, FILM COATED ORAL at 22:13

## 2024-02-05 RX ADMIN — Medication 1000 MILLIGRAM(S): at 22:14

## 2024-02-05 RX ADMIN — SERTRALINE 50 MILLIGRAM(S): 25 TABLET, FILM COATED ORAL at 22:13

## 2024-02-05 RX ADMIN — HYDROMORPHONE HYDROCHLORIDE 2 MILLIGRAM(S): 2 INJECTION INTRAMUSCULAR; INTRAVENOUS; SUBCUTANEOUS at 18:30

## 2024-02-05 RX ADMIN — SODIUM CHLORIDE 500 MILLILITER(S): 9 INJECTION INTRAMUSCULAR; INTRAVENOUS; SUBCUTANEOUS at 19:22

## 2024-02-05 RX ADMIN — APREPITANT 40 MILLIGRAM(S): 80 CAPSULE ORAL at 08:24

## 2024-02-05 RX ADMIN — HYDROMORPHONE HYDROCHLORIDE 2 MILLIGRAM(S): 2 INJECTION INTRAMUSCULAR; INTRAVENOUS; SUBCUTANEOUS at 18:33

## 2024-02-05 RX ADMIN — Medication 100 MILLIGRAM(S): at 18:30

## 2024-02-05 RX ADMIN — Medication 400 MILLIGRAM(S): at 17:51

## 2024-02-05 NOTE — PHYSICAL THERAPY INITIAL EVALUATION ADULT - ADDITIONAL COMMENTS
Pt resides in pvt home with spouse, available to assist as needed upon d/c. Pt states 3STE -HR, resides on first floor. Pt has RW and commode. Pt has a tub shower. Pt reports was independent prior to admission.

## 2024-02-05 NOTE — PATIENT PROFILE ADULT - FALL HARM RISK - UNIVERSAL INTERVENTIONS
Bed in lowest position, wheels locked, appropriate side rails in place/Call bell, personal items and telephone in reach/Instruct patient to call for assistance before getting out of bed or chair/Non-slip footwear when patient is out of bed/Ball to call system/Physically safe environment - no spills, clutter or unnecessary equipment/Purposeful Proactive Rounding/Room/bathroom lighting operational, light cord in reach

## 2024-02-05 NOTE — OCCUPATIONAL THERAPY INITIAL EVALUATION ADULT - BED MOBILITY TRAINING, PT EVAL
Patient will perform bed mobility skills (supine to sit and sit to supine) with supervision to participate in EOB activities in 2-5 OT sessions.

## 2024-02-05 NOTE — DISCHARGE NOTE PROVIDER - HOSPITAL COURSE
This patient was admitted to [facility] with a history of severe degenerative joint disease of the Right knee.  Patient underwent Pre-Surgical Testing and was medically cleared to undergo elective procedure. Patient underwent Right total knee replacement by Dr. Stoll on 2/5/24  . Procedure was well tolerated.  No operative or chidi-operative complications arose during patient's hospital course.  Patient received antibiotic according to SCIP guidelines for infection prevention.  Aspirin 81mg po bid was given for DVT prophylaxis, in addition to the use of SCDs.  Anesthesia, Medical Hospitalist, Physical Therapy and Occupational Therapy were consulted. Patient is stable for discharge with a good prognosis.  Appropriate discharge instructions and medications are provided in this document.

## 2024-02-05 NOTE — OCCUPATIONAL THERAPY INITIAL EVALUATION ADULT - TRANSFER TRAINING, PT EVAL
Patient will transfer to toilet with DME as needed with supervision in 2-5 OT sessions to increase independence with toileting tasks.

## 2024-02-05 NOTE — CONSULT NOTE ADULT - SUBJECTIVE AND OBJECTIVE BOX
Patient is a 58y old  Female who presents with a chief complaint of right knee djd severe right total knee replacement (05 Feb 2024 12:21)      FROM ADMISSION H+P:   HPI: 57 yo female reports right knee pain for many years for which she has tried HA and cortisone injections without relief. Patient presenting for elective R TKR  Patient seen post op. Tolerated procedure well without complications.  Denies any new complaints.      ----  PAST MEDICAL & SURGICAL HISTORY:  2019 novel coronavirus disease (COVID-19)      Bipolar depression      Anxiety and depression      HLD (hyperlipidemia)      Osteoarthritis of right knee      Hypothyroidism      PONV (postoperative nausea and vomiting)      Class 1 obesity with body mass index (BMI) of 33.0 to 33.9 in adult      S/P bilateral breast reduction      S/P appendectomy      S/P cholecystectomy      S/P lumpectomy, left breast      S/P cervical spinal fusion      S/P breast biopsy      History of left knee replacement          ----  Home Medications:  colesevelam 625 mg oral tablet: 3 tab(s) orally 2 times a day (05 Feb 2024 08:37)  ezetimibe 10 mg oral tablet: tab(s) orally once a day (05 Feb 2024 08:37)  levothyroxine 112 mcg (0.112 mg) oral tablet: 1 tab(s) orally once a day (05 Feb 2024 09:21)  Multiple Vitamins oral tablet: 1 tab(s) orally once a day (05 Feb 2024 08:37)  Nexletol 180 mg oral tablet: 1 tab(s) orally once a day (05 Feb 2024 08:37)  Ozempic 2 mg/3 mL (0.25 mg or 0.5 mg dose) subcutaneous solution: 0.5 milligram(s) subcutaneously every 7 days Wednesdays (05 Feb 2024 08:37)  QUEtiapine 200 mg oral tablet: orally once a day (at bedtime) (05 Feb 2024 08:37)  QUEtiapine 400 mg oral tablet, extended release: 1 tab(s) orally once a day (in the evening) (05 Feb 2024 08:37)  sertraline 50 mg oral tablet: 1 tab(s) orally once a day (05 Feb 2024 08:37)  Vitamin D2 50,000 intl units (1.25 mg) oral capsule (obsolete): orally 2 times a month (05 Feb 2024 08:37)    ----  FAMILY HISTORY:  Family history of breast cancer in mother (Mother)    FHx: heart disease    FH: pancreatic cancer (Mother)    FH: type 2 diabetes (Mother)        ----  Allergies    No Known Allergies    Intolerances    statins (Muscle Pain)  codeine (Vomiting)  propranolol (Other)      ----  Social History:  Denies current alcohol, tobacco or drug use     ----  REVIEW OF SYSTEMS:  CONSTITUTIONAL: denies fever, chills, fatigue, weakness  HEENT: denies blurred vision, sore throat  SKIN: denies new lesions, rash  CARDIOVASCULAR: denies chest pain, chest pressure, palpitations  RESPIRATORY: denies shortness of breath, sputum production  GASTROINTESTINAL: denies nausea, vomiting, diarrhea, abdominal pain  GENITOURINARY: denies dysuria, discharge  NEUROLOGICAL: denies numbness, headache, focal weakness  MUSCULOSKELETAL: denies new joint pain, muscle aches  HEMATOLOGIC: denies gross bleeding, bruising    ----  PHYSICAL EXAM:  GENERAL: patient appears well, no acute distress, appropriately interactive  EYES: sclera clear, no exudates  LUNGS: good air entry bilaterally, clear to auscultation, symmetric breath sounds  HEART: soft S1/S2, regular rate and rhythm, no murmurs noted, no noted edema to bilateral lower extremities  GASTROINTESTINAL: abdomen is soft, nontender, nondistended, normoactive bowel sounds, no palpable masses  INTEGUMENT: good skin turgor, appropriate for ethnicity, appears well perfused, no jaundice noted  MUSCULOSKELETAL: dressing clean/dry/intact, no clubbing or cyanosis, no obvious deformity  NEUROLOGIC: awake, alert, oriented x3, good muscle tone in 4 extremities, no obvious sensory deficits  PSYCHIATRIC: mood is good, affect is congruent with mood, linear and logical thought process    T(C): 36.4 (02-05-24 @ 12:06), Max: 36.9 (02-05-24 @ 08:38)  HR: 80 (02-05-24 @ 13:30) (73 - 80)  BP: 106/50 (02-05-24 @ 13:30) (99/52 - 112/61)  RR: 16 (02-05-24 @ 13:30) (12 - 20)  SpO2: 97% (02-05-24 @ 13:30) (96% - 100%)  Wt(kg): --    ----  INPATIENT MEDICATIONS  HYDROmorphone  Injectable 0.2 milliGRAM(s) IV Push every 10 minutes PRN  HYDROmorphone  Injectable 0.5 milliGRAM(s) IV Push every 10 minutes PRN  lactated ringers. 1000 milliLiter(s) IV Continuous <Continuous>  ondansetron Injectable 4 milliGRAM(s) IV Push once PRN      ----  I&O's Summary    05 Feb 2024 07:01  -  05 Feb 2024 13:51  --------------------------------------------------------  IN: 1000 mL / OUT: 150 mL / NET: 850 mL        LABS:              CAPILLARY BLOOD GLUCOSE                    ----  Personally reviewed:  Vital sign trends: [ x ] yes    [  ] no     [  ] n/a  Laboratory results: [x  ] yes    [  ] no     [  ] n/a         Patient is a 58y old  Female who presents with a chief complaint of right knee djd severe right total knee replacement (05 Feb 2024 12:21)      FROM ADMISSION H+P:   HPI: 59 yo female reports right knee pain for many years for which she has tried HA and cortisone injections without relief. Patient presenting for elective R TKR  Patient seen post op. Tolerated procedure well without complications.  Denies any new complaints. Patient is sleepy but arousable at time of exam      ----  PAST MEDICAL & SURGICAL HISTORY:  2019 novel coronavirus disease (COVID-19)      Bipolar depression      Anxiety and depression      HLD (hyperlipidemia)      Osteoarthritis of right knee      Hypothyroidism      PONV (postoperative nausea and vomiting)      Class 1 obesity with body mass index (BMI) of 33.0 to 33.9 in adult      S/P bilateral breast reduction      S/P appendectomy      S/P cholecystectomy      S/P lumpectomy, left breast      S/P cervical spinal fusion      S/P breast biopsy      History of left knee replacement          ----  Home Medications:  colesevelam 625 mg oral tablet: 3 tab(s) orally 2 times a day (05 Feb 2024 08:37)  ezetimibe 10 mg oral tablet: tab(s) orally once a day (05 Feb 2024 08:37)  levothyroxine 112 mcg (0.112 mg) oral tablet: 1 tab(s) orally once a day (05 Feb 2024 09:21)  Multiple Vitamins oral tablet: 1 tab(s) orally once a day (05 Feb 2024 08:37)  Nexletol 180 mg oral tablet: 1 tab(s) orally once a day (05 Feb 2024 08:37)  Ozempic 2 mg/3 mL (0.25 mg or 0.5 mg dose) subcutaneous solution: 0.5 milligram(s) subcutaneously every 7 days Wednesdays (05 Feb 2024 08:37)  QUEtiapine 200 mg oral tablet: orally once a day (at bedtime) (05 Feb 2024 08:37)  QUEtiapine 400 mg oral tablet, extended release: 1 tab(s) orally once a day (in the evening) (05 Feb 2024 08:37)  sertraline 50 mg oral tablet: 1 tab(s) orally once a day (05 Feb 2024 08:37)  Vitamin D2 50,000 intl units (1.25 mg) oral capsule (obsolete): orally 2 times a month (05 Feb 2024 08:37)    ----  FAMILY HISTORY:  Family history of breast cancer in mother (Mother)    FHx: heart disease    FH: pancreatic cancer (Mother)    FH: type 2 diabetes (Mother)        ----  Allergies    No Known Allergies    Intolerances    statins (Muscle Pain)  codeine (Vomiting)  propranolol (Other)      ----  Social History:  Denies current alcohol, tobacco or drug use     ----  REVIEW OF SYSTEMS:  CONSTITUTIONAL: denies fever, chills, fatigue, weakness  HEENT: denies blurred vision, sore throat  SKIN: denies new lesions, rash  CARDIOVASCULAR: denies chest pain, chest pressure, palpitations  RESPIRATORY: denies shortness of breath, sputum production  GASTROINTESTINAL: denies nausea, vomiting, diarrhea, abdominal pain  GENITOURINARY: denies dysuria, discharge  NEUROLOGICAL: denies numbness, headache, focal weakness  MUSCULOSKELETAL: denies new joint pain, muscle aches  HEMATOLOGIC: denies gross bleeding, bruising    ----  PHYSICAL EXAM:  GENERAL: patient appears well, no acute distress, appropriately interactive  EYES: sclera clear, no exudates  LUNGS: good air entry bilaterally, clear to auscultation, symmetric breath sounds  HEART: soft S1/S2, regular rate and rhythm, no murmurs noted, no noted edema to bilateral lower extremities  GASTROINTESTINAL: abdomen is soft, nontender, nondistended, normoactive bowel sounds, no palpable masses  INTEGUMENT: good skin turgor, appropriate for ethnicity, appears well perfused, no jaundice noted  MUSCULOSKELETAL: dressing clean/dry/intact, no clubbing or cyanosis, no obvious deformity  NEUROLOGIC: Sleepy but arousable, no obvious sensory deficits  PSYCHIATRIC: mood is good, affect is congruent with mood, linear and logical thought process    T(C): 36.4 (02-05-24 @ 12:06), Max: 36.9 (02-05-24 @ 08:38)  HR: 80 (02-05-24 @ 13:30) (73 - 80)  BP: 106/50 (02-05-24 @ 13:30) (99/52 - 112/61)  RR: 16 (02-05-24 @ 13:30) (12 - 20)  SpO2: 97% (02-05-24 @ 13:30) (96% - 100%)  Wt(kg): --    ----  INPATIENT MEDICATIONS  HYDROmorphone  Injectable 0.2 milliGRAM(s) IV Push every 10 minutes PRN  HYDROmorphone  Injectable 0.5 milliGRAM(s) IV Push every 10 minutes PRN  lactated ringers. 1000 milliLiter(s) IV Continuous <Continuous>  ondansetron Injectable 4 milliGRAM(s) IV Push once PRN      ----  I&O's Summary    05 Feb 2024 07:01  -  05 Feb 2024 13:51  --------------------------------------------------------  IN: 1000 mL / OUT: 150 mL / NET: 850 mL        LABS:              CAPILLARY BLOOD GLUCOSE                    ----  Personally reviewed:  Vital sign trends: [ x ] yes    [  ] no     [  ] n/a  Laboratory results: [x  ] yes    [  ] no     [  ] n/a

## 2024-02-05 NOTE — DISCHARGE NOTE PROVIDER - CARE PROVIDER_API CALL
Michael Stoll  Orthopaedic Surgery  [address], [address]  [place] [address]  Phone: [phone]  Fax: [phone]  Scheduled Appointment: 02/23/2024 11:00 AM

## 2024-02-05 NOTE — PATIENT PROFILE ADULT - PUBLIC BENEFITS
89 yo F a with h/o endometrial cancer, CAD s/p stents, Hypothyroidism, GERD and HLD, presenting with elevated BP prior to scheduled cystoscopy procedure, admitted for hypertensive urgency. 89 yo F a with h/o endometrial cancer, CAD s/p stents, Hypothyroidism, GERD and HLD, presenting with elevated BP prior to scheduled cystoscopy procedure, admitted for hypertensive urgency.                  #Anemia   -Drop in hemoglobin from 11.7/37.6 from 13.1/42.1 from yesterday   -continue to monitor for now    #Hyponatremia   -Likely secondary to Dehydration  -Gentle IVF no

## 2024-02-05 NOTE — CONSULT NOTE ADULT - ASSESSMENT
59 yo F with PMH as above S/P elective R TKR    Aftercare following surgery  -WBAT  -PT/OT  -Early ambulation  -Pain management  -Incentive Spirometry  -DVT ppx as per ortho    Hx of Bipolar  Seroquel   Sertraline    Hx of HLD   Resume home meds on discharge  Nexletol, Zetia not on formulary    Hypothyroid  Synthroid

## 2024-02-05 NOTE — PATIENT PROFILE ADULT - FALL HARM RISK - FALL HARM RISK
Surgery Patient with elevated blood pressure without any symptoms. EKG at NSR at 94 with no signs of ischemia. Mildly elevated heart rate probably secondary to anxiety and stress. Patient has good followup. Will discharge.

## 2024-02-05 NOTE — DISCHARGE NOTE PROVIDER - NSDCFUSCHEDAPPT_GEN_ALL_CORE_FT
Michael Stoll  [facility]  ORTHOSURG [address]  Scheduled Appointment: 02/23/2024

## 2024-02-05 NOTE — DISCHARGE NOTE PROVIDER - NSDCMRMEDTOKEN_GEN_ALL_CORE_FT
Aspirin EC 81 mg oral delayed release tablet: 1 tab(s) orally every 12 hours 2 hours prior to celebrex MDD: 2  CeleBREX 200 mg oral capsule: 1 cap(s) orally every 12 hours 2 hours after aspirin MDD: 2  colesevelam 625 mg oral tablet: 3 tab(s) orally 2 times a day  ezetimibe 10 mg oral tablet: tab(s) orally once a day  levothyroxine 112 mcg (0.112 mg) oral tablet: 1 tab(s) orally once a day  Multiple Vitamins oral tablet: 1 tab(s) orally once a day  Nexletol 180 mg oral tablet: 1 tab(s) orally once a day  omeprazole 20 mg oral delayed release capsule: 1 cap(s) orally once a day   Ozempic 2 mg/3 mL (0.25 mg or 0.5 mg dose) subcutaneous solution: 0.5 milligram(s) subcutaneously every 7 days Wednesdays  QUEtiapine 200 mg oral tablet: orally once a day (at bedtime)  QUEtiapine 400 mg oral tablet, extended release: 1 tab(s) orally once a day (in the evening)  sertraline 50 mg oral tablet: 1 tab(s) orally once a day  Vitamin D2 50,000 intl units (1.25 mg) oral capsule (obsolete): orally 2 times a month   Aspirin EC 81 mg oral delayed release tablet: 1 tab(s) orally every 12 hours 2 hours prior to celebrex MDD: 2  CeleBREX 200 mg oral capsule: 1 cap(s) orally every 12 hours 2 hours after aspirin MDD: 2  colesevelam 625 mg oral tablet: 3 tab(s) orally 2 times a day  ezetimibe 10 mg oral tablet: tab(s) orally once a day  levothyroxine 112 mcg (0.112 mg) oral tablet: 1 tab(s) orally once a day  Nexletol 180 mg oral tablet: 1 tab(s) orally once a day  omeprazole 20 mg oral delayed release capsule: 1 cap(s) orally once a day   oxyCODONE 5 mg oral tablet: 1 tab(s) orally every 4 hours as needed for  severe pain take 2 tabs as needed for breakthrough pain MDD: 6  Ozempic 2 mg/3 mL (0.25 mg or 0.5 mg dose) subcutaneous solution: 0.5 milligram(s) subcutaneously every 7 days Wednesdays  QUEtiapine 200 mg oral tablet: orally once a day (at bedtime)  QUEtiapine 400 mg oral tablet, extended release: 1 tab(s) orally once a day (in the evening)  sertraline 50 mg oral tablet: 1 tab(s) orally once a day  Vitamin D2 50,000 intl units (1.25 mg) oral capsule (obsolete): orally 2 times a month   Aspirin EC 81 mg oral delayed release tablet: 1 tab(s) orally every 12 hours 2 hours prior to celebrex MDD: 2  CeleBREX 200 mg oral capsule: 1 cap(s) orally every 12 hours 2 hours after aspirin MDD: 2  colesevelam 625 mg oral tablet: 3 tab(s) orally 2 times a day  ezetimibe 10 mg oral tablet: tab(s) orally once a day  levothyroxine 112 mcg (0.112 mg) oral tablet: 1 tab(s) orally once a day  Nexletol 180 mg oral tablet: 1 tab(s) orally once a day  omeprazole 20 mg oral delayed release capsule: 1 cap(s) orally once a day  ondansetron 4 mg oral tablet, disintegratin tab(s) orally every 6 hours as needed for  nausea  oxyCODONE 5 mg oral tablet: 1 tab(s) orally every 4 hours as needed for  severe pain take 2 tabs as needed for breakthrough pain MDD: 6  Ozempic 2 mg/3 mL (0.25 mg or 0.5 mg dose) subcutaneous solution: 0.5 milligram(s) subcutaneously every 7 days   QUEtiapine 200 mg oral tablet: orally once a day (at bedtime)  QUEtiapine 400 mg oral tablet, extended release: 1 tab(s) orally once a day (in the evening)  sertraline 50 mg oral tablet: 1 tab(s) orally once a day  Vitamin D2 50,000 intl units (1.25 mg) oral capsule (obsolete): orally 2 times a month

## 2024-02-05 NOTE — DISCHARGE NOTE PROVIDER - NSDCCPCAREPLAN_GEN_ALL_CORE_FT
PRINCIPAL DISCHARGE DIAGNOSIS  Diagnosis: Right knee DJD  Assessment and Plan of Treatment: Physical Therapy/Occupational Therapy for: ambulation, transfers, stairs, ADL's (activities of daily living), range of motion exercises, and isometrics  -Activity  • Weight Bearing as tolerated with rolling walker.  • Take short, frequent walks increasing the distance that you walk each day as tolerated.  • Change your position every hour to decrease pain and stiffness.  • Continue the exercises taught to you by your physical therapist.  • No driving until cleared by the doctor.  • No tub baths, hot tubs, or swimming pools until instructed by your doctor.  • Do not squat down on the floor.  • Do not kneel or twist your knee.  • Range of Motion Goals: Flexion= 120 degrees, Extension = 0 degrees  Keep incision clean and dry. May shower 5 days after surgery if no drainage from incision.  .

## 2024-02-05 NOTE — OCCUPATIONAL THERAPY INITIAL EVALUATION ADULT - ADDITIONAL COMMENTS
Pt lives in a  with , 3 NELIDA without HR, no steps inside. Pt has a tub shower without grab bars. Pt owns RW and commode. PTA, pt was independent with ADLs and functional t/fs without DME.

## 2024-02-05 NOTE — OCCUPATIONAL THERAPY INITIAL EVALUATION ADULT - GENERAL OBSERVATIONS, REHAB EVAL
Patient found supine in bed, +IV, +nasal cannula, +SCDs, +cyrocuff, +RLE bandage at knee, NAD,  at bedside.

## 2024-02-05 NOTE — BRIEF OPERATIVE NOTE - OPERATION/FINDINGS
see operative note djd severe right knee Severe DJD all surfaces  xray with stable implant placement, No FB, NO Fx

## 2024-02-05 NOTE — PHYSICAL THERAPY INITIAL EVALUATION ADULT - MANUAL MUSCLE TESTING RESULTS, REHAB EVAL
generalized weakness secondary to surgery R LE 3/5; B UE L LE grossly >=3+/5/grossly assessed due to

## 2024-02-06 ENCOUNTER — TRANSCRIPTION ENCOUNTER (OUTPATIENT)
Age: 59
End: 2024-02-06

## 2024-02-06 VITALS
HEART RATE: 89 BPM | DIASTOLIC BLOOD PRESSURE: 68 MMHG | RESPIRATION RATE: 18 BRPM | TEMPERATURE: 98 F | SYSTOLIC BLOOD PRESSURE: 134 MMHG | OXYGEN SATURATION: 96 %

## 2024-02-06 LAB
ANION GAP SERPL CALC-SCNC: 10 MMOL/L — SIGNIFICANT CHANGE UP (ref 5–17)
BUN SERPL-MCNC: 16 MG/DL — SIGNIFICANT CHANGE UP (ref 7–23)
CALCIUM SERPL-MCNC: 8.8 MG/DL — SIGNIFICANT CHANGE UP (ref 8.4–10.5)
CHLORIDE SERPL-SCNC: 103 MMOL/L — SIGNIFICANT CHANGE UP (ref 96–108)
CO2 SERPL-SCNC: 24 MMOL/L — SIGNIFICANT CHANGE UP (ref 22–31)
CREAT SERPL-MCNC: 0.77 MG/DL — SIGNIFICANT CHANGE UP (ref 0.5–1.3)
EGFR: 89 ML/MIN/1.73M2 — SIGNIFICANT CHANGE UP
GLUCOSE SERPL-MCNC: 192 MG/DL — HIGH (ref 70–99)
HCT VFR BLD CALC: 33.7 % — LOW (ref 34.5–45)
HGB BLD-MCNC: 11.1 G/DL — LOW (ref 11.5–15.5)
MCHC RBC-ENTMCNC: 30.5 PG — SIGNIFICANT CHANGE UP (ref 27–34)
MCHC RBC-ENTMCNC: 32.9 GM/DL — SIGNIFICANT CHANGE UP (ref 32–36)
MCV RBC AUTO: 92.6 FL — SIGNIFICANT CHANGE UP (ref 80–100)
NRBC # BLD: 0 /100 WBCS — SIGNIFICANT CHANGE UP (ref 0–0)
PLATELET # BLD AUTO: 252 K/UL — SIGNIFICANT CHANGE UP (ref 150–400)
POTASSIUM SERPL-MCNC: 4.3 MMOL/L — SIGNIFICANT CHANGE UP (ref 3.5–5.3)
POTASSIUM SERPL-SCNC: 4.3 MMOL/L — SIGNIFICANT CHANGE UP (ref 3.5–5.3)
RBC # BLD: 3.64 M/UL — LOW (ref 3.8–5.2)
RBC # FLD: 12.1 % — SIGNIFICANT CHANGE UP (ref 10.3–14.5)
SODIUM SERPL-SCNC: 137 MMOL/L — SIGNIFICANT CHANGE UP (ref 135–145)
WBC # BLD: 16.12 K/UL — HIGH (ref 3.8–10.5)
WBC # FLD AUTO: 16.12 K/UL — HIGH (ref 3.8–10.5)

## 2024-02-06 PROCEDURE — 73560 X-RAY EXAM OF KNEE 1 OR 2: CPT

## 2024-02-06 PROCEDURE — 97116 GAIT TRAINING THERAPY: CPT

## 2024-02-06 PROCEDURE — 85027 COMPLETE CBC AUTOMATED: CPT

## 2024-02-06 PROCEDURE — 36415 COLL VENOUS BLD VENIPUNCTURE: CPT

## 2024-02-06 PROCEDURE — 97161 PT EVAL LOW COMPLEX 20 MIN: CPT

## 2024-02-06 PROCEDURE — 97165 OT EVAL LOW COMPLEX 30 MIN: CPT

## 2024-02-06 PROCEDURE — 80048 BASIC METABOLIC PNL TOTAL CA: CPT

## 2024-02-06 PROCEDURE — 94664 DEMO&/EVAL PT USE INHALER: CPT

## 2024-02-06 PROCEDURE — 97530 THERAPEUTIC ACTIVITIES: CPT

## 2024-02-06 PROCEDURE — 97535 SELF CARE MNGMENT TRAINING: CPT

## 2024-02-06 PROCEDURE — C1776: CPT

## 2024-02-06 PROCEDURE — 97110 THERAPEUTIC EXERCISES: CPT

## 2024-02-06 PROCEDURE — C1713: CPT

## 2024-02-06 PROCEDURE — 99232 SBSQ HOSP IP/OBS MODERATE 35: CPT

## 2024-02-06 RX ORDER — ONDANSETRON 8 MG/1
1 TABLET, FILM COATED ORAL
Qty: 56 | Refills: 0
Start: 2024-02-06 | End: 2024-02-19

## 2024-02-06 RX ORDER — OXYCODONE HYDROCHLORIDE 5 MG/1
1 TABLET ORAL
Qty: 42 | Refills: 0
Start: 2024-02-06 | End: 2024-02-12

## 2024-02-06 RX ORDER — LIDOCAINE 4 G/100G
1 CREAM TOPICAL DAILY
Refills: 0 | Status: DISCONTINUED | OUTPATIENT
Start: 2024-02-06 | End: 2024-02-06

## 2024-02-06 RX ORDER — OMEPRAZOLE 10 MG/1
1 CAPSULE, DELAYED RELEASE ORAL
Qty: 28 | Refills: 0
Start: 2024-02-06 | End: 2024-03-04

## 2024-02-06 RX ADMIN — OXYCODONE HYDROCHLORIDE 10 MILLIGRAM(S): 5 TABLET ORAL at 11:28

## 2024-02-06 RX ADMIN — SERTRALINE 50 MILLIGRAM(S): 25 TABLET, FILM COATED ORAL at 12:07

## 2024-02-06 RX ADMIN — OXYCODONE HYDROCHLORIDE 5 MILLIGRAM(S): 5 TABLET ORAL at 00:37

## 2024-02-06 RX ADMIN — Medication 1000 MILLIGRAM(S): at 05:17

## 2024-02-06 RX ADMIN — OXYCODONE HYDROCHLORIDE 5 MILLIGRAM(S): 5 TABLET ORAL at 08:40

## 2024-02-06 RX ADMIN — OXYCODONE HYDROCHLORIDE 5 MILLIGRAM(S): 5 TABLET ORAL at 01:20

## 2024-02-06 RX ADMIN — SODIUM CHLORIDE 125 MILLILITER(S): 9 INJECTION, SOLUTION INTRAVENOUS at 05:17

## 2024-02-06 RX ADMIN — Medication 101.6 MILLIGRAM(S): at 05:15

## 2024-02-06 RX ADMIN — Medication 1000 MILLIGRAM(S): at 15:25

## 2024-02-06 RX ADMIN — LIDOCAINE 1 PATCH: 4 CREAM TOPICAL at 15:24

## 2024-02-06 RX ADMIN — Medication 100 MILLIGRAM(S): at 00:34

## 2024-02-06 RX ADMIN — CELECOXIB 200 MILLIGRAM(S): 200 CAPSULE ORAL at 11:28

## 2024-02-06 RX ADMIN — Medication 100 MICROGRAM(S): at 05:17

## 2024-02-06 RX ADMIN — Medication 81 MILLIGRAM(S): at 08:40

## 2024-02-06 RX ADMIN — OXYCODONE HYDROCHLORIDE 10 MILLIGRAM(S): 5 TABLET ORAL at 12:20

## 2024-02-06 RX ADMIN — Medication 1000 MILLIGRAM(S): at 05:21

## 2024-02-06 RX ADMIN — OXYCODONE HYDROCHLORIDE 5 MILLIGRAM(S): 5 TABLET ORAL at 09:40

## 2024-02-06 RX ADMIN — ONDANSETRON 4 MILLIGRAM(S): 8 TABLET, FILM COATED ORAL at 08:40

## 2024-02-06 RX ADMIN — ONDANSETRON 4 MILLIGRAM(S): 8 TABLET, FILM COATED ORAL at 00:37

## 2024-02-06 NOTE — PROGRESS NOTE ADULT - ASSESSMENT
57 yo F with PMH as above S/P elective Right TKR for OA or right knee    Aftercare following surgery  Pain Management: acceptable- continue current care Tylenol ATC/Celebrex ATC/ Oxycodone PRN  add lidocaine patch for pain control  Zofran prn nausea  Continue PT/OT  DVT proph: [x  ] low risk - Aspirin  [  ] high risk -Lovenox [  ] high risk - Eliquis [  ] other:_________  DC plan:  [ x ] Home with HC  [  ] Rehab   [  ] TBD  [  ]other:___________  ortho aware of numbness issue - doubt any intervention at this time    Hx of Bipolar  Seroquel   Sertraline    Hx of HLD   Resume home meds on discharge  Nexletol, Zetia not on formulary    Hypothyroid  Synthroid

## 2024-02-06 NOTE — PATIENT CHOICE NOTE. - NSPTCHOICESTATE_GEN_ALL_CORE

## 2024-02-06 NOTE — CARE COORDINATION ASSESSMENT. - OTHER PERTINENT DISCHARGE PLANNING INFORMATION:
59 yo female is scheduled for right total knee replacement on 2/5/2024. Met patient at bedside.  Explained role of CM, verbalized understanding. Pt was made aware a CM will remain available through hospitalization.  Contact information given in discharge/ transitions resource folder.

## 2024-02-06 NOTE — CARE COORDINATION ASSESSMENT. - PRO ARRIVE FROM
DX: 59 yo female is scheduled for right total knee replacement on 2/5/2024.      CM met with patient at beside. Patient is alert times 3.  Patient stated she is still having pain and numbness in her right toes and her right heal is red she stated, CM made Orthro Team aware JIE hernandez and Dr. Landis aware. Patient stated she lives with family. Patient stated she has 3 steps to get into house once get into house everything on first floor. Patient stated she has a walker and commode and home, and cane at bedside. CM spoke with spouse Michael at [phone].       CM verified:    PCP: DR. Nguyen [phone].  Pharmacy: [address]  and also [facility].   Insurance: [employer].   : Patient stated.    CM explained about homecare services with patient with a verbal understanding. Patient choice [facility] homecare services. CM sent referral out awaiting for response back. Will continue to follow case./home

## 2024-02-06 NOTE — CARE COORDINATION ASSESSMENT. - CURRENT MENTAL STATUS/COGNITIVE FUNCTIONING
alert/oriented to person/oriented to place/oriented to time/oriented to situation/recall memory is intact
yes

## 2024-02-06 NOTE — CARE COORDINATION ASSESSMENT. - NSDCPLANSERVICES_GEN_ALL_CORE
DX: 57 yo female is scheduled for right total knee replacement on 2/5/2024.        CM met with patient at beside. Patient is alert times 3.  Patient stated she is still having pain and numbness in her right toes and her right heal is red she stated, CM made Orthro Team aware JIE hernandez and Dr. Landis aware. Patient stated she lives with family. Patient stated she has 3 steps to get into house once get into house everything on first floor. Patient stated she has a walker and commode and home, and cane at bedside. CM spoke with spouse Michael at [phone].       CM verified:    PCP: DR. Nguyen [phone].  Pharmacy: [address]  and also [facility].   Insurance: [employer].   : Patient stated.    CM explained about homecare services with patient with a verbal understanding. Patient choice [facility] homecare services. CM sent referral out awaiting for response back. Will continue to follow case./Home Care

## 2024-02-06 NOTE — DISCHARGE NOTE NURSING/CASE MANAGEMENT/SOCIAL WORK - NSSCNAMETXT_GEN_ALL_CORE
[facility] care agency [phone] will reach out to you within 24-72 hours of your discharge to schedule home care visit/eval appointment with you. Please call agency for any queries regarding home care services

## 2024-02-06 NOTE — DISCHARGE NOTE NURSING/CASE MANAGEMENT/SOCIAL WORK - NSDCFUADDAPPT_GEN_ALL_CORE_FT
- Call your doctor if you experience:  • An increase in pain not controlled by pain medication or change in activity or  position.  • Temperature greater than 101° F.  • Redness, increased swelling or foul smelling drainage from or around the  incision.  • Numbness, tingling or a change in color or temperature of the operative leg.  • Call your doctor immediately if you experience chest pain, shortness of breath or calf pain. Physical Therapist to visit the day after hospital discharge; Registered Nurse to follow if there is a need. Please contact the home care agency at the above phone number if you have not heard from them by 12 noon on the day after your hospital discharge.  Patient owns [employer].

## 2024-02-06 NOTE — CARE COORDINATION ASSESSMENT. - NSPASTMEDSURGHISTORY_GEN_ALL_CORE_FT
PAST MEDICAL & SURGICAL HISTORY:  HLD (hyperlipidemia)      Anxiety and depression      Bipolar depression      2019 novel coronavirus disease (COVID-19)      S/P breast biopsy      S/P cervical spinal fusion      S/P lumpectomy, left breast      S/P cholecystectomy      S/P appendectomy      S/P bilateral breast reduction      Class 1 obesity with body mass index (BMI) of 33.0 to 33.9 in adult      PONV (postoperative nausea and vomiting)      Hypothyroidism      Osteoarthritis of right knee      History of left knee replacement

## 2024-02-06 NOTE — DISCHARGE NOTE NURSING/CASE MANAGEMENT/SOCIAL WORK - PATIENT PORTAL LINK FT
You can access the FollowMyHealth Patient Portal offered by [facility] by registering at the following website: [email or web address]. By joining [facility]’s FollowMyHealth portal, you will also be able to view your health information using other applications (apps) compatible with our system.

## 2024-02-06 NOTE — DISCHARGE NOTE NURSING/CASE MANAGEMENT/SOCIAL WORK - NSDCPEFALRISK_GEN_ALL_CORE
For information on Fall & Injury Prevention, visit: [email or web address] OR  [email or web address] OR  https://www.cdc.gov/steadi/patient.html

## 2024-02-06 NOTE — CARE COORDINATION ASSESSMENT. - NSCAREPROVIDERS_GEN_ALL_CORE_FT
CARE PROVIDERS:  Access Services: Saige Chiu  Administration: Dariela Guerrier  Administration: Ladi Back  Administration: Candy Mejía  Admitting: Michael Stoll  Attending: Michael Stoll  Case Management: Santaromana, Anna  Covering Team: Francisco Mendoza  Nurse: Kenia Hameed  Nurse: Raulito Mayorga  Occupational Therapy: Rox Hutchinson  PCA/Nursing Assistant: Giselle Narvaez  Physical Therapy: Adam Singh  Primary Team: Carin Flores  Primary Team: Rosmery Husain  Primary Team: Beckie Li  Primary Team: Emily Fowler  Primary Team: Peggy Gooden  Primary Team: James Soriano  Team: [facility] Hospitalists, Team  UR// Supp. Assoc.: Kimberley Sanders

## 2024-02-06 NOTE — PROGRESS NOTE ADULT - SUBJECTIVE AND OBJECTIVE BOX
Called to evaluate patient by nurse due to complaints of right foot tingling. Patient seen and examined. Complains of "tingling" of right foot and right heel discomfort when resting foot on heel. Skin intact to heel/foot. Mild swelling noted of right knee tracking down to right ankle. Sensation grossly intact to all surfaces of right foot, toes and ankle. No tenderness to palpation. Toes mobile. EHL/Ant Tibialis 5/5. Patient is able to invert/violetta foot without any discomfort. Patient advised that paresthesias are likely due to post-op swelling. Instructed to call orthopedist's office if symptoms worsen. Heel protectors to relieve pressure on heels. Dr. Dodie ackerman.
Discharge medication calendar:  ASA EC 81mg q12h x 4 weeks  APAP 1000mg q8h x 2-3 weeks  Celecoxib 200mg q12h x 2-3 weeks  Omeprazole 20mg QAM x 4 weeks  Narcotic PRN  Docusate 100mg TID while taking narcotic  Miralax, Senna, or Bisacodyl PRN for treatment of constipation  
Orthopaedic Post Op Note    Procedure: Right TKR  Surgeon: Michael Stoll     58y Female comfortable, without complaints. Was OOB with PT. Tolerating diet. Reported pain score = 5  Denies N/V, CP, SOB    PE:  Vital Signs Last 24 Hrs  T(C): 36.4 (05 Feb 2024 15:44), Max: 36.9 (05 Feb 2024 08:38)  T(F): 97.5 (05 Feb 2024 15:44), Max: 98.4 (05 Feb 2024 08:38)  HR: 90 (05 Feb 2024 17:02) (73 - 90)  BP: 113/73 (05 Feb 2024 17:02) (99/52 - 117/62)  RR: 18 (05 Feb 2024 15:44) (12 - 20)  SpO2: 97% (05 Feb 2024 17:02) (95% - 100%)    Parameters below as of 05 Feb 2024 17:02  Patient On (Oxygen Delivery Method): nasal cannula  O2 Flow (L/min): 2    General: Pt alert and oriented   Right Knee Dressing: C/D/I, Cryo/cuff in place   Bilateral LEs:  Motor:   5-/5 dorsiflexion, EHL on Right; 5/5 bilateral plantarflexion, L dorsiflexion & EHL  Sensation intact to LT  2+ DP Pulses  SCDs in place    A/P: 58y Female POD#0 s/p Right TKR  - Stable  - Acetaminophen, Celebrex, Oxycodone, Dilaudid for Pain Control   - DVT ppx: Aspirin 81mg q 12 h   - Silvia op IV abx: Ancef   - PT, OT per protocol  - F/U AM Labs  DCP = home tomorrow pending PT, OT, medical clearance       
Orthopedic Progress Note     Interval History:  No acute events overnight, pain is well controlled.  Patient denies any chest pain, SOB, N/V, fevers/chills.    Exam:  T(C): 36.6 (02-06-24 @ 03:56), Max: 37 (02-05-24 @ 19:22)  HR: 81 (02-06-24 @ 03:56) (73 - 101)  BP: 97/59 (02-05-24 @ 23:30) (97/59 - 117/62)  RR: 16 (02-06-24 @ 03:56) (12 - 20)  SpO2: 95% (02-06-24 @ 03:56) (94% - 100%)  Wt(kg): --I&O's Summary    05 Feb 2024 07:01  -  06 Feb 2024 07:00  --------------------------------------------------------  IN: 3200 mL / OUT: 1500 mL / NET: 1700 mL        Lying in bed in no apparent distress  Unlabored respirations    EXT:   Dressing clean, dry and intact.   Compression stocking in place.   Sensation is intact in the superficial peroneal, deep peroneal, tibial, sural and saphenous nerve distributions  Able to dorsiflex and plantarflex ankle. Wiggles toes  Foot warm and well perfused  Sequential compression device on            Labs:           Assessment/ Plan: JIMMY MARTINEZ is postoperative day #1 s/p right total knee replacement. Overall doing well  - please ensure ice over operative site while in bed or chair.   - PT/ OT for rehabilitation. WBAT with ROM activities as tolerated  - multimodal pain regimen.   - IV surgical prophylactic antibiotics x 24 hours.   - chemical DVT prophylaxis per protocol  - SCDs while in bed. Out of bed to chair   - thigh high compression stockings. Keep on operative leg until postoperative appointment. Can remove compression stocking to shower/ bathe.  - incentive spirometry  - disposition planning. Call [phone] to confirm or change postoperative appointment.  - page orthopedics team with questions or concerns.
Patient is a 58y old  Female who presents with a chief complaint of Right Knee Arthritis (06 Feb 2024 07:23)      INTERVAL HPI/OVERNIGHT EVENTS:  reports pain in her knee that is not controlled with oxy 5 so now taking oxy 10 but occasionally gets nauseated with it requesting zofran for home  also reports some numbness in her right toes as compared to her left.       MEDICATIONS  (STANDING):  acetaminophen     Tablet .. 1000 milliGRAM(s) Oral every 8 hours  aspirin enteric coated 81 milliGRAM(s) Oral every 12 hours  celecoxib 200 milliGRAM(s) Oral every 12 hours  colesevelam 1875 milliGRAM(s) Oral two times a day  ezetimibe 10 milliGRAM(s) Oral daily  lactated ringers. 1000 milliLiter(s) (125 mL/Hr) IV Continuous <Continuous>  levothyroxine 100 MICROGram(s) Oral daily  lidocaine   4% Patch 1 Patch Transdermal daily  NEXLETOL 180mg 1 Tablet(s)   Oral daily  polyethylene glycol 3350 17 Gram(s) Oral at bedtime  QUEtiapine 200 milliGRAM(s) Oral at bedtime  QUEtiapine 400 milliGRAM(s) Oral at bedtime  senna 2 Tablet(s) Oral at bedtime  sertraline 50 milliGRAM(s) Oral daily    MEDICATIONS  (PRN):  HYDROmorphone  Injectable 0.5 milliGRAM(s) IV Push every 3 hours PRN Breakthrough pain  magnesium hydroxide Suspension 30 milliLiter(s) Oral daily PRN Constipation  ondansetron Injectable 4 milliGRAM(s) IV Push every 6 hours PRN Nausea and/or Vomiting  oxyCODONE    IR 5 milliGRAM(s) Oral every 3 hours PRN Moderate Pain (4 - 6)  oxyCODONE    IR 10 milliGRAM(s) Oral every 3 hours PRN Severe Pain (7 - 10)      Allergies  No Known Allergies    Intolerances  statins (Muscle Pain)  codeine (Vomiting)  propranolol (Other)      REVIEW OF SYSTEMS:  CONSTITUTIONAL: No fever, weight loss, or fatigue  EYES: No eye pain, visual disturbances, or discharge  ENMT:  No difficulty hearing, tinnitus, vertigo; No sinus or throat pain  NECK: No pain or stiffness  BREASTS: No pain, masses, or nipple discharge  RESPIRATORY: No cough, wheezing, chills or hemoptysis; No shortness of breath  CARDIOVASCULAR: No chest pain, palpitations, or lightheadedness  GASTROINTESTINAL: No abdominal or epigastric pain. No nausea, vomiting, or hematemesis; No diarrhea or constipation. No melena or hematochezia.  GENITOURINARY: No dysuria, frequency, hematuria, or incontinence  NEUROLOGICAL: No headaches, vertigo, memory loss, loss of strength, or tremors  SKIN: No itching, burning, rashes, or lesions   LYMPH NODES: No enlarged glands  ENDOCRINE: No heat or cold intolerance; No hair loss; No polydipsia or polyuria  MUSCULOSKELETAL: No back pain  PSYCHIATRIC: No depression, anxiety, or mood swings  HEME/LYMPH: No easy bruising, or bleeding gums  ALLERGY AND IMMUNOLOGIC: No hives or eczema    Vital Signs Last 24 Hrs  T(C): 36.5 (06 Feb 2024 07:52), Max: 37 (05 Feb 2024 19:22)  T(F): 97.7 (06 Feb 2024 07:52), Max: 98.6 (05 Feb 2024 19:22)  HR: 76 (06 Feb 2024 07:52) (76 - 101)  BP: 108/69 (06 Feb 2024 08:40) (91/55 - 117/62)  BP(mean): --  RR: 17 (06 Feb 2024 07:52) (15 - 20)  SpO2: 93% (06 Feb 2024 07:52) (93% - 100%)    Parameters below as of 06 Feb 2024 07:52  Patient On (Oxygen Delivery Method): room air        PHYSICAL EXAM:  GENERAL: NAD, well-groomed, well-developed  HEAD:  Atraumatic, Normocephalic  EYES: conjunctiva and sclera clear  ENMT: Moist mucous membranes  NECK: Supple, No JVD  NERVOUS SYSTEM:  Alert & Oriented X3, Good concentration; Bilateral LE mobile, sensation to light touch intact; no major differences noted in strength and sensory exams between left and right toes.   CHEST/LUNG: Clear to auscultation bilaterally;   HEART: Regular rate and rhythm;   ABDOMEN: Soft, Nontender, Nondistended; Bowel sounds present  EXTREMITIES:  2+ Peripheral Pulses, No clubbing or cyanosis  LYMPH: No lymphadenopathy noted  SKIN: No rashes or lesions  INCISION:  Dressing dry and intact    LABS:                        11.1   16.12 )-----------( 252      ( 06 Feb 2024 06:00 )             33.7     06 Feb 2024 06:00    137    |  103    |  16     ----------------------------<  192    4.3     |  24     |  0.77     Ca    8.8        06 Feb 2024 06:00        Urinalysis Basic - ( 06 Feb 2024 06:00 )    Color: x / Appearance: x / SG: x / pH: x  Gluc: 192 mg/dL / Ketone: x  / Bili: x / Urobili: x   Blood: x / Protein: x / Nitrite: x   Leuk Esterase: x / RBC: x / WBC x   Sq Epi: x / Non Sq Epi: x / Bacteria: x      CAPILLARY BLOOD GLUCOSE          RADIOLOGY & ADDITIONAL TESTS:    Imaging Personally Reviewed:      [ ] Consultant(s) Notes Reviewed  [x] Care Discussed with Consultants/Other Providers:  Ortho PA- plan of care

## 2024-02-14 ENCOUNTER — TRANSCRIPTION ENCOUNTER (OUTPATIENT)
Age: 59
End: 2024-02-14

## 2024-02-23 ENCOUNTER — APPOINTMENT (OUTPATIENT)
Dept: ORTHOPEDIC SURGERY | Facility: CLINIC | Age: 59
End: 2024-02-23
Payer: COMMERCIAL

## 2024-02-23 VITALS
HEIGHT: 67 IN | BODY MASS INDEX: 31.71 KG/M2 | WEIGHT: 202 LBS | TEMPERATURE: 98.3 F | SYSTOLIC BLOOD PRESSURE: 123 MMHG | HEART RATE: 84 BPM | DIASTOLIC BLOOD PRESSURE: 84 MMHG

## 2024-02-23 DIAGNOSIS — R11.2 NAUSEA WITH VOMITING, UNSPECIFIED: ICD-10-CM

## 2024-02-23 DIAGNOSIS — Z98.890 NAUSEA WITH VOMITING, UNSPECIFIED: ICD-10-CM

## 2024-02-23 DIAGNOSIS — Z96.651 PRESENCE OF RIGHT ARTIFICIAL KNEE JOINT: ICD-10-CM

## 2024-02-23 PROCEDURE — 99024 POSTOP FOLLOW-UP VISIT: CPT

## 2024-02-23 PROCEDURE — 73562 X-RAY EXAM OF KNEE 3: CPT | Mod: RT

## 2024-02-23 RX ORDER — OXYCODONE 5 MG/1
5 TABLET ORAL
Qty: 30 | Refills: 0 | Status: ACTIVE | COMMUNITY
Start: 2024-02-12 | End: 1900-01-01

## 2024-02-23 RX ORDER — ONDANSETRON 4 MG/1
4 TABLET ORAL
Qty: 10 | Refills: 0 | Status: ACTIVE | COMMUNITY
Start: 2024-02-23 | End: 1900-01-01

## 2024-02-23 RX ORDER — AMOXICILLIN 500 MG/1
500 CAPSULE ORAL
Qty: 12 | Refills: 0 | Status: ACTIVE | COMMUNITY
Start: 2024-02-23 | End: 1900-01-01

## 2024-02-23 NOTE — HISTORY OF PRESENT ILLNESS
[___ Weeks Post Op] : [unfilled] weeks post op [4] : the patient reports pain that is 4/10 in severity [Chills] : no chills [Constipation] : no constipation [Diarrhea] : no diarrhea [Dysuria] : no dysuria [Fever] : no fever [Nausea] : no nausea [Vomiting] : no vomiting [Clean/Dry/Intact] : clean, dry and intact [Erythema] : not erythematous [Discharge] : absent of discharge [Swelling] : not swollen [Dehiscence] : not dehisced [Neuro Intact] : an unremarkable neurological exam [Negative Dilia's] : maneuvers demonstrated a negative Dilia's sign [Xray (Date:___)] : [unfilled] Xray -  [Hardware in Good Position] : hardware in good position [No Obvious Fractures] : no obvious fractures [Good Overall Alignment] : good overall alignment [Doing Well] : is doing well [No Sign of Infection] : is showing no signs of infection [Adequate Pain Control] : has adequate pain control [de-identified] : Right TKR 2/5/24 The patient is a year-old female S/P right total knee replacement performed at [facility] on 02/05/2024. Patient presents today for her first post operative visit and Dermabond tape removal. [de-identified] : The patient was discharged from the hospital with home physical therapy and home exercises. She verbalized feeling well overall. The patient reports pain of 4./10. She is taking Tylenol as needed for pain relief. Patient is using EC. Aspirin 81 MG twice a day for DVT prophylaxis after completing a course of asa as prescribed.  The patient is using Celebrex to reduce inflammation.  [de-identified] : The patient is ambulating with mild antalgic gait utilizing a cane. Patient is S/P right total knee replacement. The knee is with Dermabond tape intact. The surgical incision site is without redness, heat or drainage. No palpable hematoma or effusion. No calf tenderness. Positive distal pulses. The active ROM of the right knee is from 5-100 degrees. No evidence of ligament laxity, muscle atrophy, motor or sensory deficit. No flexion contracture or extension lag noted. The right lower extremity is with mild swelling. There is no evidence of infection or cellulitis on the incision site.  [de-identified] : 3 views of the right knee were obtained at today's visit. X-rays reveal a well-positioned, well fixed total knee replacement in good alignment. There is no obvious evidence of fractures, dislocation or osteolysis. [de-identified] : S/P right knee replacement [de-identified] : Dermabond was removed from the right knee and replaced with Steri-Strips. Patient tolerated it well. Incisional care was reviewed with the patient. Patient was advised on the nature of the healing process and on the importance of adhering to the DVT prophylaxis now with Aspirin 81 MG BID for a total of 2 more weeks. Patient to continue with physical therapy and home exercises as recommended. Prescription was given for outpatient physical therapy. Patient was encouraged to engage in isometric exercises to assist the knee to come to full extension. She was advised to continue to apply ice to the knee and keep the right lower extremity elevated above the level of the heart to decrease swelling. Prescription was given for antibiotics Amoxicillin for dental prophylaxis as per Dr. Tavares's protocol. She will continue to utilize Tylenol as needed for pain relief and Celebrex to decrease inflammation. Patient to make a follow up appointment to see Dr. Tavares in 6 weeks. She was educated on the signs and symptoms of infection to report. Patient verbalized understanding of all instructions. All her questions were addressed to her satisfaction. Patient was advised to call this office if she has new questions or concerns. My cumulative time spent on this patient's visit included: Preparation for the visit, review of the medical records, review of pertinent diagnostic studies, examination and counseling of the patient on the above diagnosis, treatment plan and prognosis, orders of diagnostic tests, medications and/or appropriate procedures and documentation in the medical records of today's visit.  Not including time spent for procedures

## 2024-03-05 RX ORDER — OXYCODONE 5 MG/1
5 TABLET ORAL
Qty: 28 | Refills: 0 | Status: ACTIVE | COMMUNITY
Start: 2024-03-05 | End: 1900-01-01

## 2024-03-05 RX ORDER — CELECOXIB 200 MG/1
200 CAPSULE ORAL TWICE DAILY
Qty: 30 | Refills: 1 | Status: ACTIVE | COMMUNITY
Start: 2024-03-05 | End: 1900-01-01

## 2024-03-28 NOTE — PATIENT PROFILE ADULT - VISION (WITH CORRECTIVE LENSES IF THE PATIENT USUALLY WEARS THEM):
Normal vision: sees adequately in most situations; can see medication labels, newsprint
Attending Discharge Physical Examination:

## 2024-03-29 ENCOUNTER — APPOINTMENT (OUTPATIENT)
Dept: ORTHOPEDIC SURGERY | Facility: CLINIC | Age: 59
End: 2024-03-29
Payer: COMMERCIAL

## 2024-03-29 VITALS — HEART RATE: 97 BPM | DIASTOLIC BLOOD PRESSURE: 84 MMHG | SYSTOLIC BLOOD PRESSURE: 127 MMHG

## 2024-03-29 PROCEDURE — 99024 POSTOP FOLLOW-UP VISIT: CPT

## 2024-03-29 PROCEDURE — 73562 X-RAY EXAM OF KNEE 3: CPT | Mod: RT

## 2024-03-29 RX ORDER — OXYCODONE 5 MG/1
5 TABLET ORAL
Qty: 30 | Refills: 0 | Status: ACTIVE | COMMUNITY
Start: 2024-03-29 | End: 1900-01-01

## 2024-03-29 RX ORDER — ACETAMINOPHEN EXTRA STRENGTH 500 MG/1
500 TABLET ORAL
Qty: 90 | Refills: 1 | Status: ACTIVE | COMMUNITY
Start: 2024-03-29 | End: 1900-01-01

## 2024-03-29 NOTE — DISCUSSION/SUMMARY
[de-identified] : Now s/p right TKA on 2/5/2024 for follow-up. Overall doing well   At this point we have suggested continued exercises and pain control. Appropriate instructions regarding further care, restrictions, and further recovery has been given.    We have also counseled the patient to avoid any dental procedures for the first three months postoperatively.. We remain available for any further questions and concerns and instructed patient that we would like to see them if any concerns for infection including fevers, redness, or increased swelling.

## 2024-03-29 NOTE — HISTORY OF PRESENT ILLNESS
[de-identified] : JIMMY MARTINEZ  is an 59 year-old female presents today status post right total knee arthroplasty on 2/5/2024 for followup. The patient is living at home. The patient has maintained weight bearing as tolerated. The patient is ambulating with no assistive device and working with physical therapy. The patient has weaned off all narcotic pain medicine. The patient is progressing well and is happy with the progress.  She is still having some trouble sleeping at night due to pain.  She has run out of all forms of pain medication   No fever, chills, or signs of infection. Today, patient does not state any other associated signs or complaints outside of those described. The patient presents for postoperative followup.

## 2024-03-29 NOTE — PHYSICAL EXAM
[de-identified] : General Appearance / Station: Well developed, well nourished, in no acute distress Orientation: Oriented to person, place, and time Gait & Station: Ambulates with assistive device Neurologic: Normal leg sensation Cardiovascular: Warm extremity Lymphatics: No lymphedema   OPERATIVE KNEE Skin: incision clean, dry and intact. Well healing. No erythema, warmth or tenderness. Range of motion: 0-1 20 Strength: Within Normal Limits. Able to straight leg raise                                                                  Neurovascular Exam: Able to wiggle toes and dorsiflex/ plantarflex ankle. Foot warm, well perfused       [de-identified] : Imaging: Three views of the right knee show satisfactory placement of a right cemented total knee arthroplasty. There are no changes in alignment of hardware and no signs of radiographic failure compared to previous radiographs.

## 2024-04-19 ENCOUNTER — NON-APPOINTMENT (OUTPATIENT)
Age: 59
End: 2024-04-19

## 2024-05-17 ENCOUNTER — APPOINTMENT (OUTPATIENT)
Dept: ORTHOPEDIC SURGERY | Facility: CLINIC | Age: 59
End: 2024-05-17
Payer: COMMERCIAL

## 2024-05-17 PROCEDURE — 73562 X-RAY EXAM OF KNEE 3: CPT | Mod: RT

## 2024-05-17 PROCEDURE — 99214 OFFICE O/P EST MOD 30 MIN: CPT

## 2024-05-17 RX ORDER — DICLOFENAC SODIUM 20 MG/G
2 SOLUTION TOPICAL
Qty: 1 | Refills: 1 | Status: ACTIVE | COMMUNITY
Start: 2024-05-17 | End: 1900-01-01

## 2024-05-19 NOTE — PHYSICAL EXAM
[de-identified] : General Appearance / Station: Well developed, well nourished, in no acute distress Orientation: Oriented to person, place, and time Gait & Station: Ambulates with assistive device Neurologic: Normal leg sensation Cardiovascular: Warm extremity Lymphatics: No lymphedema   OPERATIVE KNEE Skin: incision clean, dry and intact. Well healing. No erythema, warmth or tenderness. Range of motion: 0-1 20 Strength: Within Normal Limits. Able to straight leg raise                                                                  Neurovascular Exam: Able to wiggle toes and dorsiflex/ plantarflex ankle. Foot warm, well perfused       [de-identified] : Imaging: Three views of the right knee show satisfactory placement of a right cemented total knee arthroplasty. There are no changes in alignment of hardware and no signs of radiographic failure compared to previous radiographs.

## 2024-05-19 NOTE — HISTORY OF PRESENT ILLNESS
[de-identified] : JIMMY MARTINEZ  is an 59 year-old female presents today status post right total knee arthroplasty on 2/5/2024 for followup. The patient is living at home. The patient has maintained weight bearing as tolerated. The patient is ambulating with no assistive device and working with physical therapy. The patient has weaned off all narcotic pain medicine. The patient is progressing well.   No fever, chills, or signs of infection. Today, patient does not state any other associated signs or complaints outside of those described. The patient presents for postoperative followup.

## 2024-05-19 NOTE — DISCUSSION/SUMMARY
[de-identified] : Now s/p right TKA on 2/5/2024 for follow-up. Overall doing well.  Recommend continuing with Celebrex to help with some inflammation.   At this point we have suggested continued exercises and pain control. Appropriate instructions regarding further care, restrictions, and further recovery has been given.     We remain available for any further questions and concerns and instructed patient that we would like to see them if any concerns for infection including fevers, redness, or increased swelling.  Follow-up in 3 months

## 2024-06-14 RX ORDER — CELECOXIB 200 MG/1
200 CAPSULE ORAL
Qty: 30 | Refills: 1 | Status: ACTIVE | COMMUNITY
Start: 2024-03-29 | End: 1900-01-01

## 2024-08-12 DIAGNOSIS — Z12.11 ENCOUNTER FOR SCREENING FOR MALIGNANT NEOPLASM OF COLON: ICD-10-CM

## 2024-08-12 DIAGNOSIS — Z12.39 ENCOUNTER FOR OTHER SCREENING FOR MALIGNANT NEOPLASM OF BREAST: ICD-10-CM

## 2024-08-12 DIAGNOSIS — R92.30 DENSE BREASTS, UNSPECIFIED: ICD-10-CM

## 2024-08-13 ENCOUNTER — APPOINTMENT (OUTPATIENT)
Dept: OBGYN | Facility: CLINIC | Age: 59
End: 2024-08-13
Payer: COMMERCIAL

## 2024-08-13 VITALS
BODY MASS INDEX: 33.27 KG/M2 | DIASTOLIC BLOOD PRESSURE: 80 MMHG | WEIGHT: 212 LBS | SYSTOLIC BLOOD PRESSURE: 116 MMHG | HEIGHT: 67 IN

## 2024-08-13 DIAGNOSIS — Z00.00 ENCOUNTER FOR GENERAL ADULT MEDICAL EXAMINATION W/OUT ABNORMAL FINDINGS: ICD-10-CM

## 2024-08-13 DIAGNOSIS — Z13.820 ENCOUNTER FOR SCREENING FOR OSTEOPOROSIS: ICD-10-CM

## 2024-08-13 PROCEDURE — 99386 PREV VISIT NEW AGE 40-64: CPT

## 2024-08-13 NOTE — PLAN
[FreeTextEntry1] : Wellness exam.  Normal physical examination. Recommendations: Mammography with bilateral breast ultrasound, dermatological examination. Status post Cologuard in 2024.  Discussed proper nutrition and physical exercise. Reviewed age-appropriate vaccinations.

## 2024-08-13 NOTE — HISTORY OF PRESENT ILLNESS
[Patient reported mammogram was normal] : Patient reported mammogram was normal [Patient reported breast sonogram was normal] : Patient reported breast sonogram was normal [Patient reported colonoscopy was normal] : Patient reported colonoscopy was normal [postmenopausal] : postmenopausal [Y] : Positive pregnancy history [TextBox_4] : 59-year-old  2 para 2 postmenopausal presents for an annual examination.  Review systems constitutional: difficulty with weight loss. [Mammogramdate] : 2023 [BreastSonogramDate] : 2023 [ColonoscopyDate] : 2024 [TextBox_102] : menopause age unknown  [PGHxTotal] : 2 [[facility] : [facility] [[facility]] : 2

## 2024-08-13 NOTE — PHYSICAL EXAM
[Alert] : alert [No Lymphadenopathy] : no lymphadenopathy [Soft] : soft [Non-tender] : non-tender [Non-distended] : non-distended [No HSM] : No HSM [No Mass] : no mass [FreeTextEntry6] : Dense.  Fibrocystic and glandular.  Nontender.  No predominant nodules or lymphadenopathy [Vulvar Atrophy] : vulvar atrophy [Labia Majora] : normal [Labia Minora] : normal [Atrophy] : atrophy [Normal] : normal [Uterine Adnexae] : normal [FreeTextEntry5] : A Pap smear was obtained [FreeTextEntry9] : No palpable masses

## 2024-08-14 LAB — HPV HIGH+LOW RISK DNA PNL CVX: NOT DETECTED

## 2024-08-18 LAB — CYTOLOGY CVX/VAG DOC THIN PREP: NORMAL

## 2024-09-06 ENCOUNTER — APPOINTMENT (OUTPATIENT)
Dept: ORTHOPEDIC SURGERY | Facility: CLINIC | Age: 59
End: 2024-09-06
Payer: COMMERCIAL

## 2024-09-06 VITALS — HEART RATE: 89 BPM | SYSTOLIC BLOOD PRESSURE: 118 MMHG | DIASTOLIC BLOOD PRESSURE: 81 MMHG

## 2024-09-06 PROCEDURE — 99214 OFFICE O/P EST MOD 30 MIN: CPT

## 2024-09-06 PROCEDURE — 73562 X-RAY EXAM OF KNEE 3: CPT | Mod: RT

## 2024-09-06 RX ORDER — CELECOXIB 200 MG/1
200 CAPSULE ORAL
Qty: 60 | Refills: 1 | Status: ACTIVE | COMMUNITY
Start: 2024-09-06 | End: 1900-01-01

## 2024-09-06 NOTE — PHYSICAL EXAM
[de-identified] : General Appearance / Station: Well developed, well nourished, in no acute distress Orientation: Oriented to person, place, and time Gait & Station: Ambulates with assistive device Neurologic: Normal leg sensation Cardiovascular: Warm extremity Lymphatics: No lymphedema   OPERATIVE KNEE Skin: Well healing. No erythema, warmth or tenderness. Range of motion: 0-1 20 Strength: Within Normal Limits. Able to straight leg raise                                                                  Neurovascular Exam: Able to wiggle toes and dorsiflex/ plantarflex ankle. Foot warm, well perfused       [de-identified] : Imaging: Three views of the right knee show satisfactory placement of a right cemented total knee arthroplasty. There are no changes in alignment of hardware and no signs of radiographic failure compared to previous radiographs.

## 2024-09-06 NOTE — HISTORY OF PRESENT ILLNESS
[de-identified] : JIMMY MARTINEZ  is an 59 year-old female presents today status post right total knee arthroplasty on 2/5/2024 for followup. The patient is living at home. The patient has maintained weight bearing as tolerated. The patient is ambulating with no assistive device and working with physical therapy. The patient has weaned off all narcotic pain medicine. The patient is progressing well.   No fever, chills, or signs of infection. Today, patient does not state any other associated signs or complaints outside of those described. The patient presents for postoperative followup.

## 2024-09-06 NOTE — DISCUSSION/SUMMARY
[de-identified] : Now s/p right TKA on 2/5/2024 for follow-up. Overall doing well.  Recommend continuing with Celebrex to help with some inflammation.   At this point we have suggested continued exercises and pain control. Appropriate instructions regarding further care, restrictions, and further recovery has been given.     We remain available for any further questions and concerns and instructed patient that we would like to see them if any concerns for infection including fevers, redness, or increased swelling.  Follow-up in 6 months

## 2024-11-20 ENCOUNTER — RX RENEWAL (OUTPATIENT)
Age: 59
End: 2024-11-20

## 2024-12-09 ENCOUNTER — NON-APPOINTMENT (OUTPATIENT)
Age: 59
End: 2024-12-09

## 2025-03-11 ENCOUNTER — APPOINTMENT (OUTPATIENT)
Dept: ORTHOPEDIC SURGERY | Facility: CLINIC | Age: 60
End: 2025-03-11
Payer: COMMERCIAL

## 2025-03-11 PROCEDURE — 73562 X-RAY EXAM OF KNEE 3: CPT | Mod: RT

## 2025-03-11 PROCEDURE — 99213 OFFICE O/P EST LOW 20 MIN: CPT

## 2025-03-24 ENCOUNTER — TRANSCRIPTION ENCOUNTER (OUTPATIENT)
Age: 60
End: 2025-03-24

## 2025-03-25 ENCOUNTER — TRANSCRIPTION ENCOUNTER (OUTPATIENT)
Age: 60
End: 2025-03-25

## 2025-07-24 NOTE — PHYSICAL THERAPY INITIAL EVALUATION ADULT - IMPAIRMENTS CONTRIBUTING TO GAIT DEVIATIONS, PT EVAL
Patient should make an appointment with Lauren Mathew to discuss this and what her other options might b.  Can be virtual.  Sharlene Briones PA-C     R LE numbness/impaired motor control/decreased ROM/decreased sensation/decreased strength

## 2025-08-14 ENCOUNTER — APPOINTMENT (OUTPATIENT)
Age: 60
End: 2025-08-14

## 2025-08-14 VITALS
SYSTOLIC BLOOD PRESSURE: 110 MMHG | WEIGHT: 218 LBS | HEART RATE: 94 BPM | DIASTOLIC BLOOD PRESSURE: 80 MMHG | OXYGEN SATURATION: 95 % | HEIGHT: 67 IN | BODY MASS INDEX: 34.21 KG/M2

## 2025-08-14 DIAGNOSIS — Z01.419 ENCOUNTER FOR GYNECOLOGICAL EXAMINATION (GENERAL) (ROUTINE) W/OUT ABNORMAL FINDINGS: ICD-10-CM

## 2025-08-14 PROCEDURE — 99396 PREV VISIT EST AGE 40-64: CPT

## 2025-08-14 PROCEDURE — 99459 PELVIC EXAMINATION: CPT

## 2025-08-16 LAB
BACTERIA UR CULT: NORMAL
HPV HIGH+LOW RISK DNA PNL CVX: NOT DETECTED

## 2025-08-20 LAB — CYTOLOGY CVX/VAG DOC THIN PREP: NORMAL

## 2025-08-21 ENCOUNTER — APPOINTMENT (OUTPATIENT)
Age: 60
End: 2025-08-21

## 2025-08-21 PROCEDURE — 81003 URINALYSIS AUTO W/O SCOPE: CPT | Mod: QW

## 2025-08-23 DIAGNOSIS — N39.0 URINARY TRACT INFECTION, SITE NOT SPECIFIED: ICD-10-CM

## 2025-08-23 RX ORDER — AMPICILLIN 500 MG/1
500 CAPSULE ORAL
Qty: 20 | Refills: 0 | Status: ACTIVE | COMMUNITY
Start: 2025-08-23 | End: 1900-01-01

## 2025-08-27 DIAGNOSIS — R39.9 UNSPECIFIED SYMPTOMS AND SIGNS INVOLVING THE GENITOURINARY SYSTEM: ICD-10-CM

## 2025-09-04 ENCOUNTER — NON-APPOINTMENT (OUTPATIENT)
Age: 60
End: 2025-09-04

## 2025-09-04 DIAGNOSIS — R92.8 OTHER ABNORMAL AND INCONCLUSIVE FINDINGS ON DIAGNOSTIC IMAGING OF BREAST: ICD-10-CM

## 2025-09-04 DIAGNOSIS — Z91.89 OTHER SPECIFIED PERSONAL RISK FACTORS, NOT ELSEWHERE CLASSIFIED: ICD-10-CM

## 2025-09-06 ENCOUNTER — TRANSCRIPTION ENCOUNTER (OUTPATIENT)
Age: 60
End: 2025-09-06

## 2025-09-06 DIAGNOSIS — N76.0 ACUTE VAGINITIS: ICD-10-CM

## 2025-09-06 RX ORDER — TERCONAZOLE 8 MG/G
0.8 CREAM VAGINAL
Qty: 1 | Refills: 0 | Status: ACTIVE | COMMUNITY
Start: 2025-09-06 | End: 1900-01-01

## 2025-09-06 RX ORDER — FLUCONAZOLE 150 MG/1
150 TABLET ORAL
Qty: 4 | Refills: 0 | Status: ACTIVE | COMMUNITY
Start: 2025-09-06 | End: 1900-01-01

## 2025-09-08 PROBLEM — R92.8 ABNORMAL MAMMOGRAM: Status: ACTIVE | Noted: 2025-09-08 | Resolved: 2025-09-15

## 2025-09-08 PROBLEM — Z91.89 INCREASED RISK OF BREAST CANCER: Status: ACTIVE | Noted: 2025-09-08

## 2025-09-12 ENCOUNTER — NON-APPOINTMENT (OUTPATIENT)
Age: 60
End: 2025-09-12

## 2025-09-18 ENCOUNTER — TRANSCRIPTION ENCOUNTER (OUTPATIENT)
Age: 60
End: 2025-09-18

## 2025-09-19 ENCOUNTER — TRANSCRIPTION ENCOUNTER (OUTPATIENT)
Age: 60
End: 2025-09-19

## (undated) DEVICE — CRYO/CUFF GRAVITY COOLER KNEE LARGE

## (undated) DEVICE — SYR LUER LOK 50CC

## (undated) DEVICE — SOL IRR BAG NS 0.9% 3000ML

## (undated) DEVICE — ELCTR AQUAMANTYS BIPOLAR SEALER 6.0

## (undated) DEVICE — SOL IRR POUR H2O 1500ML

## (undated) DEVICE — NDL SPINAL 18G X 3.5" (PINK)

## (undated) DEVICE — WARMING BLANKET UPPER ADULT

## (undated) DEVICE — GOWN XL W TOWEL

## (undated) DEVICE — SOLIDIFIER CANN EXPRESS 3K

## (undated) DEVICE — DRSG CURITY GAUZE SPONGE 4 X 4" 12-PLY

## (undated) DEVICE — DRAPE XL SHEET 77X98"

## (undated) DEVICE — NDL HYPO SAFE 18G X 1.5" (PINK)

## (undated) DEVICE — ORTHOALIGN KNEEALIGN TIBIAL AND FEMORAL

## (undated) DEVICE — SOL IRR POUR NS 0.9% 500ML

## (undated) DEVICE — SYM-STRYKER SYSTEM 7: Type: DURABLE MEDICAL EQUIPMENT

## (undated) DEVICE — SOL BETADINE POUCH 0.75OZ STERILE

## (undated) DEVICE — TOURNIQUET CUFF 34" DUAL PORT W PLC

## (undated) DEVICE — DRSG DERMABOND PRINEO 60CM

## (undated) DEVICE — SUT PDS II 0 36" CT-1

## (undated) DEVICE — SUT PDS II 1 27" CT-1

## (undated) DEVICE — VENODYNE/SCD SLEEVE CALF MEDIUM

## (undated) DEVICE — PACK TOTAL KNEE

## (undated) DEVICE — HOOD FLYTE STRYKER HELMET SHIELD

## (undated) DEVICE — DRSG COMBINE 5X9"

## (undated) DEVICE — ELCTR STRYKER NEPTUNE SMOKE EVACUATION PENCIL (GREEN)

## (undated) DEVICE — SUT STRATAFIX SPIRAL MONOCRYL PLUS 4-0 14CM PS-2 UNDYED

## (undated) DEVICE — SUT QUILL PDO 1 30CM T9 DA

## (undated) DEVICE — SUT MONOCRYL 3-0 18" PS-1

## (undated) DEVICE — SUT MONOSOF 3-0 30" C-16

## (undated) DEVICE — KIT OPTIVAC CEMENT MIXER 40GM

## (undated) DEVICE — ORTHOALIGN PLUS UNIT

## (undated) DEVICE — HANDPIECE INTERPULSE W MULTI TIP

## (undated) DEVICE — ELCTR ROCKER SWITCH PENCIL BLUE 10FT

## (undated) DEVICE — DRSG DERMABOND 0.7ML

## (undated) DEVICE — DRAPE U POLY BLUE 60X72"

## (undated) DEVICE — SPECIMEN CONTAINER 4OZ

## (undated) DEVICE — SUT MONOCRYL 2-0 36" CT-1 UNDYED

## (undated) DEVICE — WOUND IRR IRRISEPT W 0.5 CHG

## (undated) DEVICE — DRSG STERISTRIPS 0.5 X 4"